# Patient Record
Sex: FEMALE | Race: ASIAN | NOT HISPANIC OR LATINO
[De-identification: names, ages, dates, MRNs, and addresses within clinical notes are randomized per-mention and may not be internally consistent; named-entity substitution may affect disease eponyms.]

---

## 2017-09-05 ENCOUNTER — APPOINTMENT (OUTPATIENT)
Dept: OBGYN | Facility: CLINIC | Age: 22
End: 2017-09-05

## 2017-09-05 PROBLEM — Z00.00 ENCOUNTER FOR PREVENTIVE HEALTH EXAMINATION: Status: ACTIVE | Noted: 2017-09-05

## 2018-08-25 ENCOUNTER — EMERGENCY (EMERGENCY)
Facility: HOSPITAL | Age: 23
LOS: 1 days | Discharge: HOME | End: 2018-08-25
Attending: EMERGENCY MEDICINE | Admitting: EMERGENCY MEDICINE
Payer: MEDICAID

## 2018-08-25 VITALS
DIASTOLIC BLOOD PRESSURE: 68 MMHG | TEMPERATURE: 98 F | SYSTOLIC BLOOD PRESSURE: 105 MMHG | RESPIRATION RATE: 16 BRPM | OXYGEN SATURATION: 100 % | HEART RATE: 88 BPM

## 2018-08-25 VITALS
SYSTOLIC BLOOD PRESSURE: 113 MMHG | HEART RATE: 96 BPM | DIASTOLIC BLOOD PRESSURE: 78 MMHG | TEMPERATURE: 99 F | RESPIRATION RATE: 18 BRPM | OXYGEN SATURATION: 100 %

## 2018-08-25 LAB
ALBUMIN SERPL ELPH-MCNC: 4.3 G/DL — SIGNIFICANT CHANGE UP (ref 3.3–5)
ALP SERPL-CCNC: 60 U/L — SIGNIFICANT CHANGE UP (ref 40–120)
ALT FLD-CCNC: 19 U/L — SIGNIFICANT CHANGE UP (ref 4–33)
APPEARANCE UR: CLEAR — SIGNIFICANT CHANGE UP
AST SERPL-CCNC: 23 U/L — SIGNIFICANT CHANGE UP (ref 4–32)
BASOPHILS # BLD AUTO: 0.03 K/UL — SIGNIFICANT CHANGE UP (ref 0–0.2)
BASOPHILS NFR BLD AUTO: 0.2 % — SIGNIFICANT CHANGE UP (ref 0–2)
BILIRUB SERPL-MCNC: 0.5 MG/DL — SIGNIFICANT CHANGE UP (ref 0.2–1.2)
BILIRUB UR-MCNC: NEGATIVE — SIGNIFICANT CHANGE UP
BLOOD UR QL VISUAL: NEGATIVE — SIGNIFICANT CHANGE UP
BUN SERPL-MCNC: 6 MG/DL — LOW (ref 7–23)
CALCIUM SERPL-MCNC: 9.3 MG/DL — SIGNIFICANT CHANGE UP (ref 8.4–10.5)
CHLORIDE SERPL-SCNC: 102 MMOL/L — SIGNIFICANT CHANGE UP (ref 98–107)
CO2 SERPL-SCNC: 22 MMOL/L — SIGNIFICANT CHANGE UP (ref 22–31)
COLOR SPEC: SIGNIFICANT CHANGE UP
CREAT SERPL-MCNC: 0.49 MG/DL — LOW (ref 0.5–1.3)
EOSINOPHIL # BLD AUTO: 0.01 K/UL — SIGNIFICANT CHANGE UP (ref 0–0.5)
EOSINOPHIL NFR BLD AUTO: 0.1 % — SIGNIFICANT CHANGE UP (ref 0–6)
GLUCOSE SERPL-MCNC: 82 MG/DL — SIGNIFICANT CHANGE UP (ref 70–99)
GLUCOSE UR-MCNC: NEGATIVE — SIGNIFICANT CHANGE UP
HCT VFR BLD CALC: 35 % — SIGNIFICANT CHANGE UP (ref 34.5–45)
HGB BLD-MCNC: 11.8 G/DL — SIGNIFICANT CHANGE UP (ref 11.5–15.5)
IMM GRANULOCYTES # BLD AUTO: 0.07 # — SIGNIFICANT CHANGE UP
IMM GRANULOCYTES NFR BLD AUTO: 0.5 % — SIGNIFICANT CHANGE UP (ref 0–1.5)
KETONES UR-MCNC: SIGNIFICANT CHANGE UP
LEUKOCYTE ESTERASE UR-ACNC: NEGATIVE — SIGNIFICANT CHANGE UP
LYMPHOCYTES # BLD AUTO: 1.15 K/UL — SIGNIFICANT CHANGE UP (ref 1–3.3)
LYMPHOCYTES # BLD AUTO: 7.8 % — LOW (ref 13–44)
MCHC RBC-ENTMCNC: 31.9 PG — SIGNIFICANT CHANGE UP (ref 27–34)
MCHC RBC-ENTMCNC: 33.7 % — SIGNIFICANT CHANGE UP (ref 32–36)
MCV RBC AUTO: 94.6 FL — SIGNIFICANT CHANGE UP (ref 80–100)
MONOCYTES # BLD AUTO: 0.56 K/UL — SIGNIFICANT CHANGE UP (ref 0–0.9)
MONOCYTES NFR BLD AUTO: 3.8 % — SIGNIFICANT CHANGE UP (ref 2–14)
NEUTROPHILS # BLD AUTO: 12.83 K/UL — HIGH (ref 1.8–7.4)
NEUTROPHILS NFR BLD AUTO: 87.6 % — HIGH (ref 43–77)
NITRITE UR-MCNC: NEGATIVE — SIGNIFICANT CHANGE UP
NRBC # FLD: 0 — SIGNIFICANT CHANGE UP
PH UR: 8 — SIGNIFICANT CHANGE UP (ref 5–8)
PLATELET # BLD AUTO: 254 K/UL — SIGNIFICANT CHANGE UP (ref 150–400)
PMV BLD: 10.2 FL — SIGNIFICANT CHANGE UP (ref 7–13)
POTASSIUM SERPL-MCNC: 4.4 MMOL/L — SIGNIFICANT CHANGE UP (ref 3.5–5.3)
POTASSIUM SERPL-SCNC: 4.4 MMOL/L — SIGNIFICANT CHANGE UP (ref 3.5–5.3)
PROT SERPL-MCNC: 7 G/DL — SIGNIFICANT CHANGE UP (ref 6–8.3)
PROT UR-MCNC: NEGATIVE — SIGNIFICANT CHANGE UP
RBC # BLD: 3.7 M/UL — LOW (ref 3.8–5.2)
RBC # FLD: 12.1 % — SIGNIFICANT CHANGE UP (ref 10.3–14.5)
SODIUM SERPL-SCNC: 139 MMOL/L — SIGNIFICANT CHANGE UP (ref 135–145)
SP GR SPEC: 1.01 — SIGNIFICANT CHANGE UP (ref 1–1.04)
UROBILINOGEN FLD QL: NORMAL — SIGNIFICANT CHANGE UP
WBC # BLD: 14.65 K/UL — HIGH (ref 3.8–10.5)
WBC # FLD AUTO: 14.65 K/UL — HIGH (ref 3.8–10.5)

## 2018-08-25 PROCEDURE — 76830 TRANSVAGINAL US NON-OB: CPT | Mod: 26

## 2018-08-25 PROCEDURE — 99284 EMERGENCY DEPT VISIT MOD MDM: CPT | Mod: 25

## 2018-08-25 RX ORDER — ONDANSETRON 8 MG/1
4 TABLET, FILM COATED ORAL ONCE
Qty: 0 | Refills: 0 | Status: COMPLETED | OUTPATIENT
Start: 2018-08-25 | End: 2018-08-25

## 2018-08-25 RX ORDER — LIDOCAINE 4 G/100G
10 CREAM TOPICAL ONCE
Qty: 0 | Refills: 0 | Status: COMPLETED | OUTPATIENT
Start: 2018-08-25 | End: 2018-08-25

## 2018-08-25 RX ORDER — SODIUM CHLORIDE 9 MG/ML
1000 INJECTION INTRAMUSCULAR; INTRAVENOUS; SUBCUTANEOUS ONCE
Qty: 0 | Refills: 0 | Status: COMPLETED | OUTPATIENT
Start: 2018-08-25 | End: 2018-08-25

## 2018-08-25 RX ADMIN — SODIUM CHLORIDE 1000 MILLILITER(S): 9 INJECTION INTRAMUSCULAR; INTRAVENOUS; SUBCUTANEOUS at 06:10

## 2018-08-25 RX ADMIN — Medication 30 MILLILITER(S): at 06:29

## 2018-08-25 RX ADMIN — LIDOCAINE 10 MILLILITER(S): 4 CREAM TOPICAL at 06:29

## 2018-08-25 NOTE — ED ADULT NURSE NOTE - NSIMPLEMENTINTERV_GEN_ALL_ED
Implemented All Universal Safety Interventions:  [place] to call system. Call bell, personal items and telephone within reach. Instruct patient to call for assistance. Room bathroom lighting operational. Non-slip footwear when patient is off stretcher. Physically safe environment: no spills, clutter or unnecessary equipment. Stretcher in lowest position, wheels locked, appropriate side rails in place.

## 2018-08-25 NOTE — ED PROVIDER NOTE - PHYSICAL EXAMINATION
Gen: No acute distress, alert, cooperative  Head: Normocephalic, Atraumatic  HEENT: PERRL, oral mucosa moist, no swelling or erythema intraorally, normal conjunctiva  Lung: CTAB, no respiratory distress, no crackles or wheezes  CV: rrr, no murmur  Abd: soft, NTND, no rebound or guarding  MSK: No LE edema  Neuro: No focal neurologic deficits  Skin: Warm and dry, no evidence of rash   Psych: normal affect, follows commands

## 2018-08-25 NOTE — ED PROVIDER NOTE - OBJECTIVE STATEMENT
23yo  LMP 18 presenting with nausea/vomiting, sore throat since 7pm. Last ate at 2pm. Currently feels like she cant swallow anything due to nausea and throat pain. Even throwing up water at this point. Hasn't taken any meds for pain or nausea yet. When she first vomited noticed streaks of red, now vomit is brownish colored.

## 2018-08-25 NOTE — ED ADULT NURSE NOTE - CHIEF COMPLAINT QUOTE
Pt arrives by [facility] for nausea/vomiting causing sore throat and feeling difficulty to breathe.  States vomiting began ~7pm last night.  , 12-13wks pregnant, LMP 6/2.  Reports having lower pelvic cramps, no vag bleeding or discharge.  Denies having complications in prior pregnancy, no significant PMHx or abdominal surgeries.  Pt vitally stable, resps even/unlabored on RA, o2sat 100% on RA.

## 2018-08-25 NOTE — ED PROVIDER NOTE - MEDICAL DECISION MAKING DETAILS
23yo  LMP 18 presenting with nausea/vomiting, sore throat since 7pm. Vomiting dark brown vomit, prior had streaks of blood. Labs, anti-nausea, reassess. 21yo  LMP 18 presenting with nausea/vomiting, sore throat since 7pm. Vomiting dark brown vomit, prior had streaks of blood. Appears well, vitals stable. Labs, anti-nausea, reassess. Patient declines cxr at this time.

## 2018-08-25 NOTE — ED PROVIDER NOTE - PROGRESS NOTE DETAILS
Berna Bello M.D: pt signed out to me pending labs and US results. labs demonstrate mild elevation in WBC which is nonspecific, and US demonstrates single live IUP without complications. no bacteria seen in urine. results discussed with pt who is understanding and feels improved with medications. mic brown.

## 2018-08-25 NOTE — ED ADULT NURSE NOTE - OBJECTIVE STATEMENT
Received pt in room 17, ambulatory, pt A&Ox4, respirations even and unlabored b/l. Abdomen soft, nondistended, nontender. Pt states "I have a flight at 7am, I just want to see the doctor." Awaiting MD garza. Will continue to monitor. Received pt in room 17, ambulatory, pt A&Ox4, respirations even and unlabored b/l. Abdomen soft, nondistended, nontender. Pt states "I have a flight at 7am, I just want to see the doctor." IVL 20g Angiocath placed on left AC. Labs sent. Awaiting MD garza. Will continue to monitor.

## 2018-08-25 NOTE — ED PROVIDER NOTE - ATTENDING CONTRIBUTION TO CARE
21 y/o F  at approx 12 weeks gestation (no previously confirmed IUP) here with nausea and vomiting.  Pt reports she had an episode of vomiting tonight.  Afterwards pt developed an uncomfortable sensation in her throat.  Pt reports retching and additional episodes of vomiting; last emesis noted blood streaks.  Pt currently c/o sore throat/uncomfortable sensation in throat.  Only with abd pain with vomiting, none otherwise.  NO vaginal bleeding, dysuria, fever, back pain.  Well appearing, sitting comfortably in stretcher, awake and alert, nontoxic.  AF/VSS.  Mucosa is moist, post oropharynx is clear, uvula midline, no stridor/drooling/voice changes.  Neck is supple.  Lungs cta bl.  Cards nl S1/S2, RRR, no MRG.  Abd soft ntnd.  No pedal edema or calf tenderness.  Plan for labs tvus urine antiemetics/maalox/lido for symptomatic relief.  As pt does not yet have confirmed IUP, will need to obtain US here.

## 2018-08-25 NOTE — ED ADULT TRIAGE NOTE - CHIEF COMPLAINT QUOTE
Pt arrives by [facility] for nausea/vomiting causing sore throat and feeling difficulty to breathe.  States vomiting began ~7pm last night.  , 12-13wks pregnant, LMP 6/2.  Reports having lower pelvic cramps, no vag bleeding or discharge.  Denies having complications in prior pregnancy, no significant PMHx or abdominal surgeries.  Pt vitally stable, resps even/unlabored on RA, o2sat 100% on RA.

## 2018-08-26 LAB
BACTERIA UR CULT: SIGNIFICANT CHANGE UP
SPECIMEN SOURCE: SIGNIFICANT CHANGE UP

## 2018-11-12 ENCOUNTER — APPOINTMENT (OUTPATIENT)
Dept: ANTEPARTUM | Facility: CLINIC | Age: 23
End: 2018-11-12
Payer: MEDICAID

## 2018-11-12 ENCOUNTER — ASOB RESULT (OUTPATIENT)
Age: 23
End: 2018-11-12

## 2018-11-12 PROCEDURE — 76811 OB US DETAILED SNGL FETUS: CPT

## 2018-12-03 ENCOUNTER — ASOB RESULT (OUTPATIENT)
Age: 23
End: 2018-12-03

## 2018-12-03 ENCOUNTER — APPOINTMENT (OUTPATIENT)
Dept: ANTEPARTUM | Facility: CLINIC | Age: 23
End: 2018-12-03
Payer: MEDICAID

## 2018-12-03 PROCEDURE — 76805 OB US >/= 14 WKS SNGL FETUS: CPT

## 2018-12-03 PROCEDURE — 76820 UMBILICAL ARTERY ECHO: CPT

## 2018-12-17 ENCOUNTER — ASOB RESULT (OUTPATIENT)
Age: 23
End: 2018-12-17

## 2018-12-17 ENCOUNTER — APPOINTMENT (OUTPATIENT)
Dept: ANTEPARTUM | Facility: CLINIC | Age: 23
End: 2018-12-17
Payer: MEDICAID

## 2018-12-17 ENCOUNTER — APPOINTMENT (OUTPATIENT)
Dept: ANTEPARTUM | Facility: CLINIC | Age: 23
End: 2018-12-17

## 2018-12-17 PROCEDURE — 76816 OB US FOLLOW-UP PER FETUS: CPT

## 2018-12-17 PROCEDURE — 76819 FETAL BIOPHYS PROFIL W/O NST: CPT

## 2019-01-14 ENCOUNTER — ASOB RESULT (OUTPATIENT)
Age: 24
End: 2019-01-14

## 2019-01-14 ENCOUNTER — APPOINTMENT (OUTPATIENT)
Dept: ANTEPARTUM | Facility: CLINIC | Age: 24
End: 2019-01-14
Payer: MEDICAID

## 2019-01-14 PROCEDURE — 76819 FETAL BIOPHYS PROFIL W/O NST: CPT

## 2019-01-14 PROCEDURE — 76816 OB US FOLLOW-UP PER FETUS: CPT

## 2019-02-26 ENCOUNTER — OUTPATIENT (OUTPATIENT)
Dept: OUTPATIENT SERVICES | Facility: HOSPITAL | Age: 24
LOS: 1 days | End: 2019-02-26

## 2019-02-26 ENCOUNTER — EMERGENCY (EMERGENCY)
Facility: HOSPITAL | Age: 24
LOS: 1 days | Discharge: STILL ADMITTED OR ADMINISTRATIVE | End: 2019-02-26
Admitting: EMERGENCY MEDICINE

## 2019-02-26 VITALS
TEMPERATURE: 98 F | WEIGHT: 160.06 LBS | HEART RATE: 125 BPM | SYSTOLIC BLOOD PRESSURE: 108 MMHG | RESPIRATION RATE: 20 BRPM | HEIGHT: 60 IN | DIASTOLIC BLOOD PRESSURE: 68 MMHG

## 2019-02-26 VITALS
TEMPERATURE: 98 F | HEART RATE: 110 BPM | OXYGEN SATURATION: 100 % | DIASTOLIC BLOOD PRESSURE: 47 MMHG | SYSTOLIC BLOOD PRESSURE: 117 MMHG | RESPIRATION RATE: 16 BRPM

## 2019-02-26 DIAGNOSIS — Z98.891 HISTORY OF UTERINE SCAR FROM PREVIOUS SURGERY: Chronic | ICD-10-CM

## 2019-02-26 DIAGNOSIS — Z34.90 ENCOUNTER FOR SUPERVISION OF NORMAL PREGNANCY, UNSPECIFIED, UNSPECIFIED TRIMESTER: ICD-10-CM

## 2019-02-26 DIAGNOSIS — O34.219 MATERNAL CARE FOR UNSPECIFIED TYPE SCAR FROM PREVIOUS CESAREAN DELIVERY: ICD-10-CM

## 2019-02-26 DIAGNOSIS — Z3A.00 WEEKS OF GESTATION OF PREGNANCY NOT SPECIFIED: ICD-10-CM

## 2019-02-26 DIAGNOSIS — O26.899 OTHER SPECIFIED PREGNANCY RELATED CONDITIONS, UNSPECIFIED TRIMESTER: ICD-10-CM

## 2019-02-26 LAB
APPEARANCE UR: SIGNIFICANT CHANGE UP
APPEARANCE UR: SIGNIFICANT CHANGE UP
BACTERIA # UR AUTO: NEGATIVE — SIGNIFICANT CHANGE UP
BACTERIA # UR AUTO: SIGNIFICANT CHANGE UP
BILIRUB UR-MCNC: NEGATIVE — SIGNIFICANT CHANGE UP
BILIRUB UR-MCNC: NEGATIVE — SIGNIFICANT CHANGE UP
BLD GP AB SCN SERPL QL: NEGATIVE — SIGNIFICANT CHANGE UP
BLOOD UR QL VISUAL: NEGATIVE — SIGNIFICANT CHANGE UP
BLOOD UR QL VISUAL: NEGATIVE — SIGNIFICANT CHANGE UP
COLOR SPEC: SIGNIFICANT CHANGE UP
COLOR SPEC: YELLOW — SIGNIFICANT CHANGE UP
FIBRINOGEN PPP-MCNC: 684 MG/DL — HIGH (ref 350–510)
GLUCOSE UR-MCNC: NEGATIVE — SIGNIFICANT CHANGE UP
GLUCOSE UR-MCNC: NEGATIVE — SIGNIFICANT CHANGE UP
HCT VFR BLD CALC: 36.4 % — SIGNIFICANT CHANGE UP (ref 34.5–45)
HGB BLD-MCNC: 11.8 G/DL — SIGNIFICANT CHANGE UP (ref 11.5–15.5)
HYALINE CASTS # UR AUTO: HIGH
HYALINE CASTS # UR AUTO: SIGNIFICANT CHANGE UP
KETONES UR-MCNC: NEGATIVE — SIGNIFICANT CHANGE UP
KETONES UR-MCNC: NEGATIVE — SIGNIFICANT CHANGE UP
LEUKOCYTE ESTERASE UR-ACNC: SIGNIFICANT CHANGE UP
LEUKOCYTE ESTERASE UR-ACNC: SIGNIFICANT CHANGE UP
MCHC RBC-ENTMCNC: 31 PG — SIGNIFICANT CHANGE UP (ref 27–34)
MCHC RBC-ENTMCNC: 32.4 % — SIGNIFICANT CHANGE UP (ref 32–36)
MCV RBC AUTO: 95.5 FL — SIGNIFICANT CHANGE UP (ref 80–100)
NITRITE UR-MCNC: NEGATIVE — SIGNIFICANT CHANGE UP
NITRITE UR-MCNC: NEGATIVE — SIGNIFICANT CHANGE UP
NRBC # FLD: 0 K/UL — LOW (ref 25–125)
PH UR: 7 — SIGNIFICANT CHANGE UP (ref 5–8)
PH UR: 7.5 — SIGNIFICANT CHANGE UP (ref 5–8)
PLATELET # BLD AUTO: 253 K/UL — SIGNIFICANT CHANGE UP (ref 150–400)
PMV BLD: 10.2 FL — SIGNIFICANT CHANGE UP (ref 7–13)
PROT UR-MCNC: 10 — SIGNIFICANT CHANGE UP
PROT UR-MCNC: 20 — SIGNIFICANT CHANGE UP
RBC # BLD: 3.81 M/UL — SIGNIFICANT CHANGE UP (ref 3.8–5.2)
RBC # FLD: 13.1 % — SIGNIFICANT CHANGE UP (ref 10.3–14.5)
RBC CASTS # UR COMP ASSIST: SIGNIFICANT CHANGE UP (ref 0–?)
RBC CASTS # UR COMP ASSIST: SIGNIFICANT CHANGE UP (ref 0–?)
RH IG SCN BLD-IMP: POSITIVE — SIGNIFICANT CHANGE UP
RH IG SCN BLD-IMP: POSITIVE — SIGNIFICANT CHANGE UP
SP GR SPEC: 1.01 — SIGNIFICANT CHANGE UP (ref 1–1.04)
SP GR SPEC: 1.01 — SIGNIFICANT CHANGE UP (ref 1–1.04)
SQUAMOUS # UR AUTO: SIGNIFICANT CHANGE UP
SQUAMOUS # UR AUTO: SIGNIFICANT CHANGE UP
UROBILINOGEN FLD QL: NORMAL — SIGNIFICANT CHANGE UP
UROBILINOGEN FLD QL: NORMAL — SIGNIFICANT CHANGE UP
WBC # BLD: 9.14 K/UL — SIGNIFICANT CHANGE UP (ref 3.8–10.5)
WBC # FLD AUTO: 9.14 K/UL — SIGNIFICANT CHANGE UP (ref 3.8–10.5)
WBC UR QL: >50 — HIGH (ref 0–?)
WBC UR QL: SIGNIFICANT CHANGE UP (ref 0–?)

## 2019-02-26 RX ORDER — SODIUM CHLORIDE 9 MG/ML
1000 INJECTION, SOLUTION INTRAVENOUS
Qty: 0 | Refills: 0 | Status: DISCONTINUED | OUTPATIENT
Start: 2019-03-12 | End: 2019-03-12

## 2019-02-26 NOTE — ED ADULT NURSE NOTE - CHIEF COMPLAINT QUOTE
Pt brought in by EMS 38 weeks pregnant complaining of pelvic and back pain. Pt to be seen in L&D. Pt noted to have 15G R AC .

## 2019-02-26 NOTE — H&P PST ADULT - ASSESSMENT
Pt. is a 24 yo pregnant female that had a prior .  Pt. accidentally banged her stomach into the container top while reaching for something last night.  Pt. did not notify her OB.  Colleague called OB's office and spoke to Danay CAI informing her of injury to her stomach as well as tachycardia while at PST.  EMS called and pt. sent to L&D. Pt. is a 24 yo pregnant female that had a prior .  Pt. accidentally banged her stomach into the container top while reaching for something last night.  Pt. did not notify her OB.  Colleague called OB's office and spoke to Danay CAI informing her of injury to her stomach as well as tachycardia while at PST.  EMS called and arrived prior to EKG.  EKG done on paramedics ZOLL machine.  EKG in the chart.  Pt. sent to L&D.

## 2019-02-26 NOTE — H&P PST ADULT - ANESTHESIA, PREVIOUS REACTION, PROFILE
"they did my  because I had a fever and my son had a fever and they said it's not good for the baby so they took him out", denies family hx

## 2019-02-26 NOTE — H&P PST ADULT - NSANTHOSAYNRD_GEN_A_CORE
No. ESTELA screening performed.  STOP BANG Legend: 0-2 = LOW Risk; 3-4 = INTERMEDIATE Risk; 5-8 = HIGH Risk

## 2019-02-27 LAB — T PALLIDUM AB TITR SER: NEGATIVE — SIGNIFICANT CHANGE UP

## 2019-03-11 ENCOUNTER — TRANSCRIPTION ENCOUNTER (OUTPATIENT)
Age: 24
End: 2019-03-11

## 2019-03-12 ENCOUNTER — INPATIENT (INPATIENT)
Facility: HOSPITAL | Age: 24
LOS: 2 days | Discharge: HOME | End: 2019-03-15
Attending: OBSTETRICS & GYNECOLOGY | Admitting: OBSTETRICS & GYNECOLOGY
Payer: MEDICAID

## 2019-03-12 VITALS — HEIGHT: 60 IN | WEIGHT: 158.73 LBS

## 2019-03-12 DIAGNOSIS — O34.219 MATERNAL CARE FOR UNSPECIFIED TYPE SCAR FROM PREVIOUS CESAREAN DELIVERY: ICD-10-CM

## 2019-03-12 DIAGNOSIS — Z98.891 HISTORY OF UTERINE SCAR FROM PREVIOUS SURGERY: Chronic | ICD-10-CM

## 2019-03-12 LAB
BASOPHILS # BLD AUTO: 0.02 K/UL — SIGNIFICANT CHANGE UP (ref 0–0.2)
BASOPHILS # BLD AUTO: 0.02 K/UL — SIGNIFICANT CHANGE UP (ref 0–0.2)
BASOPHILS NFR BLD AUTO: 0.2 % — SIGNIFICANT CHANGE UP (ref 0–2)
BASOPHILS NFR BLD AUTO: 0.3 % — SIGNIFICANT CHANGE UP (ref 0–2)
BLD GP AB SCN SERPL QL: NEGATIVE — SIGNIFICANT CHANGE UP
EOSINOPHIL # BLD AUTO: 0 K/UL — SIGNIFICANT CHANGE UP (ref 0–0.5)
EOSINOPHIL # BLD AUTO: 0.06 K/UL — SIGNIFICANT CHANGE UP (ref 0–0.5)
EOSINOPHIL NFR BLD AUTO: 0 % — SIGNIFICANT CHANGE UP (ref 0–6)
EOSINOPHIL NFR BLD AUTO: 0.8 % — SIGNIFICANT CHANGE UP (ref 0–6)
HCT VFR BLD CALC: 33.5 % — LOW (ref 34.5–45)
HCT VFR BLD CALC: 38.7 % — SIGNIFICANT CHANGE UP (ref 34.5–45)
HGB BLD-MCNC: 10.9 G/DL — LOW (ref 11.5–15.5)
HGB BLD-MCNC: 12.7 G/DL — SIGNIFICANT CHANGE UP (ref 11.5–15.5)
IMM GRANULOCYTES NFR BLD AUTO: 0.4 % — SIGNIFICANT CHANGE UP (ref 0–1.5)
IMM GRANULOCYTES NFR BLD AUTO: 0.5 % — SIGNIFICANT CHANGE UP (ref 0–1.5)
LYMPHOCYTES # BLD AUTO: 1.49 K/UL — SIGNIFICANT CHANGE UP (ref 1–3.3)
LYMPHOCYTES # BLD AUTO: 1.94 K/UL — SIGNIFICANT CHANGE UP (ref 1–3.3)
LYMPHOCYTES # BLD AUTO: 11.7 % — LOW (ref 13–44)
LYMPHOCYTES # BLD AUTO: 24.3 % — SIGNIFICANT CHANGE UP (ref 13–44)
MCHC RBC-ENTMCNC: 31 PG — SIGNIFICANT CHANGE UP (ref 27–34)
MCHC RBC-ENTMCNC: 31.6 PG — SIGNIFICANT CHANGE UP (ref 27–34)
MCHC RBC-ENTMCNC: 32.5 % — SIGNIFICANT CHANGE UP (ref 32–36)
MCHC RBC-ENTMCNC: 32.8 % — SIGNIFICANT CHANGE UP (ref 32–36)
MCV RBC AUTO: 94.4 FL — SIGNIFICANT CHANGE UP (ref 80–100)
MCV RBC AUTO: 97.1 FL — SIGNIFICANT CHANGE UP (ref 80–100)
MONOCYTES # BLD AUTO: 0.55 K/UL — SIGNIFICANT CHANGE UP (ref 0–0.9)
MONOCYTES # BLD AUTO: 0.71 K/UL — SIGNIFICANT CHANGE UP (ref 0–0.9)
MONOCYTES NFR BLD AUTO: 5.6 % — SIGNIFICANT CHANGE UP (ref 2–14)
MONOCYTES NFR BLD AUTO: 6.9 % — SIGNIFICANT CHANGE UP (ref 2–14)
NEUTROPHILS # BLD AUTO: 10.47 K/UL — HIGH (ref 1.8–7.4)
NEUTROPHILS # BLD AUTO: 5.37 K/UL — SIGNIFICANT CHANGE UP (ref 1.8–7.4)
NEUTROPHILS NFR BLD AUTO: 67.3 % — SIGNIFICANT CHANGE UP (ref 43–77)
NEUTROPHILS NFR BLD AUTO: 82 % — HIGH (ref 43–77)
NRBC # FLD: 0 K/UL — LOW (ref 25–125)
NRBC # FLD: 0 K/UL — LOW (ref 25–125)
PLATELET # BLD AUTO: 219 K/UL — SIGNIFICANT CHANGE UP (ref 150–400)
PLATELET # BLD AUTO: 242 K/UL — SIGNIFICANT CHANGE UP (ref 150–400)
PMV BLD: 10 FL — SIGNIFICANT CHANGE UP (ref 7–13)
PMV BLD: 9.6 FL — SIGNIFICANT CHANGE UP (ref 7–13)
RBC # BLD: 3.45 M/UL — LOW (ref 3.8–5.2)
RBC # BLD: 4.1 M/UL — SIGNIFICANT CHANGE UP (ref 3.8–5.2)
RBC # FLD: 12.8 % — SIGNIFICANT CHANGE UP (ref 10.3–14.5)
RBC # FLD: 13 % — SIGNIFICANT CHANGE UP (ref 10.3–14.5)
RH IG SCN BLD-IMP: POSITIVE — SIGNIFICANT CHANGE UP
WBC # BLD: 12.75 K/UL — HIGH (ref 3.8–10.5)
WBC # BLD: 7.97 K/UL — SIGNIFICANT CHANGE UP (ref 3.8–10.5)
WBC # FLD AUTO: 12.75 K/UL — HIGH (ref 3.8–10.5)
WBC # FLD AUTO: 7.97 K/UL — SIGNIFICANT CHANGE UP (ref 3.8–10.5)

## 2019-03-12 PROCEDURE — 71045 X-RAY EXAM CHEST 1 VIEW: CPT | Mod: 26

## 2019-03-12 RX ORDER — GLYCERIN ADULT
1 SUPPOSITORY, RECTAL RECTAL AT BEDTIME
Qty: 0 | Refills: 0 | Status: DISCONTINUED | OUTPATIENT
Start: 2019-03-12 | End: 2019-03-15

## 2019-03-12 RX ORDER — SIMETHICONE 80 MG/1
80 TABLET, CHEWABLE ORAL EVERY 4 HOURS
Qty: 0 | Refills: 0 | Status: DISCONTINUED | OUTPATIENT
Start: 2019-03-12 | End: 2019-03-15

## 2019-03-12 RX ORDER — TETANUS TOXOID, REDUCED DIPHTHERIA TOXOID AND ACELLULAR PERTUSSIS VACCINE, ADSORBED 5; 2.5; 8; 8; 2.5 [IU]/.5ML; [IU]/.5ML; UG/.5ML; UG/.5ML; UG/.5ML
0.5 SUSPENSION INTRAMUSCULAR ONCE
Qty: 0 | Refills: 0 | Status: DISCONTINUED | OUTPATIENT
Start: 2019-03-12 | End: 2019-03-15

## 2019-03-12 RX ORDER — OXYTOCIN 10 UNIT/ML
41.67 VIAL (ML) INJECTION
Qty: 20 | Refills: 0 | Status: DISCONTINUED | OUTPATIENT
Start: 2019-03-12 | End: 2019-03-12

## 2019-03-12 RX ORDER — HEPARIN SODIUM 5000 [USP'U]/ML
5000 INJECTION INTRAVENOUS; SUBCUTANEOUS EVERY 12 HOURS
Qty: 0 | Refills: 0 | Status: DISCONTINUED | OUTPATIENT
Start: 2019-03-12 | End: 2019-03-15

## 2019-03-12 RX ORDER — DOCUSATE SODIUM 100 MG
100 CAPSULE ORAL
Qty: 0 | Refills: 0 | Status: DISCONTINUED | OUTPATIENT
Start: 2019-03-12 | End: 2019-03-15

## 2019-03-12 RX ORDER — OXYCODONE HYDROCHLORIDE 5 MG/1
5 TABLET ORAL EVERY 4 HOURS
Qty: 0 | Refills: 0 | Status: COMPLETED | OUTPATIENT
Start: 2019-03-12 | End: 2019-03-19

## 2019-03-12 RX ORDER — LANOLIN
1 OINTMENT (GRAM) TOPICAL
Qty: 0 | Refills: 0 | Status: DISCONTINUED | OUTPATIENT
Start: 2019-03-12 | End: 2019-03-15

## 2019-03-12 RX ORDER — KETOROLAC TROMETHAMINE 30 MG/ML
30 SYRINGE (ML) INJECTION EVERY 6 HOURS
Qty: 0 | Refills: 0 | Status: DISCONTINUED | OUTPATIENT
Start: 2019-03-12 | End: 2019-03-13

## 2019-03-12 RX ORDER — SODIUM CHLORIDE 9 MG/ML
1000 INJECTION, SOLUTION INTRAVENOUS
Qty: 0 | Refills: 0 | Status: DISCONTINUED | OUTPATIENT
Start: 2019-03-12 | End: 2019-03-12

## 2019-03-12 RX ORDER — OXYCODONE HYDROCHLORIDE 5 MG/1
5 TABLET ORAL
Qty: 0 | Refills: 0 | Status: COMPLETED | OUTPATIENT
Start: 2019-03-12 | End: 2019-03-19

## 2019-03-12 RX ORDER — OXYCODONE HYDROCHLORIDE 5 MG/1
10 TABLET ORAL
Qty: 0 | Refills: 0 | Status: DISCONTINUED | OUTPATIENT
Start: 2019-03-12 | End: 2019-03-13

## 2019-03-12 RX ORDER — HYDROMORPHONE HYDROCHLORIDE 2 MG/ML
1 INJECTION INTRAMUSCULAR; INTRAVENOUS; SUBCUTANEOUS
Qty: 0 | Refills: 0 | Status: DISCONTINUED | OUTPATIENT
Start: 2019-03-12 | End: 2019-03-12

## 2019-03-12 RX ORDER — DIPHENHYDRAMINE HCL 50 MG
25 CAPSULE ORAL EVERY 6 HOURS
Qty: 0 | Refills: 0 | Status: DISCONTINUED | OUTPATIENT
Start: 2019-03-12 | End: 2019-03-15

## 2019-03-12 RX ORDER — NALOXONE HYDROCHLORIDE 4 MG/.1ML
0.1 SPRAY NASAL
Qty: 0 | Refills: 0 | Status: DISCONTINUED | OUTPATIENT
Start: 2019-03-12 | End: 2019-03-13

## 2019-03-12 RX ORDER — METOCLOPRAMIDE HCL 10 MG
10 TABLET ORAL ONCE
Qty: 0 | Refills: 0 | Status: COMPLETED | OUTPATIENT
Start: 2019-03-12 | End: 2019-03-12

## 2019-03-12 RX ORDER — ONDANSETRON 8 MG/1
4 TABLET, FILM COATED ORAL EVERY 6 HOURS
Qty: 0 | Refills: 0 | Status: DISCONTINUED | OUTPATIENT
Start: 2019-03-12 | End: 2019-03-13

## 2019-03-12 RX ORDER — CITRIC ACID/SODIUM CITRATE 300-500 MG
30 SOLUTION, ORAL ORAL ONCE
Qty: 0 | Refills: 0 | Status: COMPLETED | OUTPATIENT
Start: 2019-03-12 | End: 2019-03-12

## 2019-03-12 RX ORDER — IBUPROFEN 200 MG
600 TABLET ORAL EVERY 6 HOURS
Qty: 0 | Refills: 0 | Status: COMPLETED | OUTPATIENT
Start: 2019-03-12 | End: 2020-02-08

## 2019-03-12 RX ORDER — OXYCODONE HYDROCHLORIDE 5 MG/1
5 TABLET ORAL
Qty: 0 | Refills: 0 | Status: DISCONTINUED | OUTPATIENT
Start: 2019-03-12 | End: 2019-03-13

## 2019-03-12 RX ORDER — FAMOTIDINE 10 MG/ML
20 INJECTION INTRAVENOUS ONCE
Qty: 0 | Refills: 0 | Status: COMPLETED | OUTPATIENT
Start: 2019-03-12 | End: 2019-03-12

## 2019-03-12 RX ORDER — BUTORPHANOL TARTRATE 2 MG/ML
0.25 INJECTION, SOLUTION INTRAMUSCULAR; INTRAVENOUS EVERY 6 HOURS
Qty: 0 | Refills: 0 | Status: DISCONTINUED | OUTPATIENT
Start: 2019-03-12 | End: 2019-03-13

## 2019-03-12 RX ORDER — SODIUM CHLORIDE 9 MG/ML
1000 INJECTION, SOLUTION INTRAVENOUS ONCE
Qty: 0 | Refills: 0 | Status: COMPLETED | OUTPATIENT
Start: 2019-03-12 | End: 2019-03-12

## 2019-03-12 RX ORDER — FERROUS SULFATE 325(65) MG
325 TABLET ORAL DAILY
Qty: 0 | Refills: 0 | Status: DISCONTINUED | OUTPATIENT
Start: 2019-03-12 | End: 2019-03-15

## 2019-03-12 RX ORDER — ACETAMINOPHEN 500 MG
975 TABLET ORAL EVERY 6 HOURS
Qty: 0 | Refills: 0 | Status: DISCONTINUED | OUTPATIENT
Start: 2019-03-12 | End: 2019-03-15

## 2019-03-12 RX ADMIN — Medication 30 MILLILITER(S): at 06:35

## 2019-03-12 RX ADMIN — Medication 30 MILLIGRAM(S): at 15:54

## 2019-03-12 RX ADMIN — Medication 30 MILLIGRAM(S): at 23:16

## 2019-03-12 RX ADMIN — SODIUM CHLORIDE 125 MILLILITER(S): 9 INJECTION, SOLUTION INTRAVENOUS at 06:37

## 2019-03-12 RX ADMIN — FAMOTIDINE 20 MILLIGRAM(S): 10 INJECTION INTRAVENOUS at 06:55

## 2019-03-12 RX ADMIN — SODIUM CHLORIDE 75 MILLILITER(S): 9 INJECTION, SOLUTION INTRAVENOUS at 09:32

## 2019-03-12 RX ADMIN — Medication 125 MILLIUNIT(S)/MIN: at 09:32

## 2019-03-12 RX ADMIN — Medication 30 MILLIGRAM(S): at 15:28

## 2019-03-12 RX ADMIN — HEPARIN SODIUM 5000 UNIT(S): 5000 INJECTION INTRAVENOUS; SUBCUTANEOUS at 15:34

## 2019-03-12 RX ADMIN — Medication 10 MILLIGRAM(S): at 06:35

## 2019-03-12 RX ADMIN — SODIUM CHLORIDE 2000 MILLILITER(S): 9 INJECTION, SOLUTION INTRAVENOUS at 06:35

## 2019-03-13 ENCOUNTER — TRANSCRIPTION ENCOUNTER (OUTPATIENT)
Age: 24
End: 2019-03-13

## 2019-03-13 RX ORDER — IBUPROFEN 200 MG
600 TABLET ORAL EVERY 6 HOURS
Qty: 0 | Refills: 0 | Status: DISCONTINUED | OUTPATIENT
Start: 2019-03-13 | End: 2019-03-15

## 2019-03-13 RX ADMIN — Medication 30 MILLIGRAM(S): at 05:39

## 2019-03-13 RX ADMIN — HEPARIN SODIUM 5000 UNIT(S): 5000 INJECTION INTRAVENOUS; SUBCUTANEOUS at 18:16

## 2019-03-13 RX ADMIN — Medication 30 MILLIGRAM(S): at 06:16

## 2019-03-13 RX ADMIN — Medication 100 MILLIGRAM(S): at 05:40

## 2019-03-13 RX ADMIN — SIMETHICONE 80 MILLIGRAM(S): 80 TABLET, CHEWABLE ORAL at 05:39

## 2019-03-13 RX ADMIN — Medication 975 MILLIGRAM(S): at 13:30

## 2019-03-13 RX ADMIN — Medication 975 MILLIGRAM(S): at 18:15

## 2019-03-13 RX ADMIN — Medication 975 MILLIGRAM(S): at 12:42

## 2019-03-13 RX ADMIN — Medication 30 MILLIGRAM(S): at 00:10

## 2019-03-13 RX ADMIN — Medication 1 TABLET(S): at 12:41

## 2019-03-13 RX ADMIN — Medication 975 MILLIGRAM(S): at 19:15

## 2019-03-13 RX ADMIN — HEPARIN SODIUM 5000 UNIT(S): 5000 INJECTION INTRAVENOUS; SUBCUTANEOUS at 05:39

## 2019-03-13 RX ADMIN — Medication 325 MILLIGRAM(S): at 12:42

## 2019-03-13 NOTE — PROGRESS NOTE ADULT - SUBJECTIVE AND OBJECTIVE BOX
Pain Service Follow-up  Postop Day  1    S/P  C- Section    T(C): 36.7 (03-13-19 @ 05:29), Max: 37 (03-12-19 @ 13:52)  HR: 103 (03-13-19 @ 05:29) (74 - 108)  BP: 110/70 (03-13-19 @ 05:29) (75/61 - 128/61)  RR: 17 (03-13-19 @ 05:29) (12 - 20)  SpO2: 97% (03-13-19 @ 05:29) (96% - 100%)  Wt(kg): --      THERAPY:  [X] Spinal morphine   [] Epidural morphine   [] IV PCA Hydromorphone 1 mg/ml    Sedation Score:	  [X] Alert	      [  ] Drowsy       [  ] Arousable	[  ] Asleep         [  ] Unresponsive    Side Effects:	  [X] None	      [  ] Nausea       [  ] Pruritus        [  ] Weakness   [  ] Numbness        ASSESSMENT/ PLAN   [ X ] Discontinue         [  ] Continue    [ X ] Documentation and Verification of current medications       Satisfactory Post Anesthetic Course

## 2019-03-13 NOTE — DISCHARGE NOTE OB - PATIENT PORTAL LINK FT
You can access the [facility] Patient Portal, offered by [facility], by registering with the following website: [email or web address]

## 2019-03-13 NOTE — LACTATION INITIAL EVALUATION - INTERVENTION OUTCOME
verbalizes understanding/demonstrates understanding of teaching/nbn demonstrated  deep latch and  performed  with sucking and swallowing  noted .   offered  assistance   as needed .  reviewed  breastfeeding  log  and daily  nbn  goals.

## 2019-03-13 NOTE — DISCHARGE NOTE OB - CARE PLAN
Principal Discharge DX:	 delivery delivered  Goal:	pelvic rest x 6 weeks  Assessment and plan of treatment:	pelvic rest x 6 weeks

## 2019-03-13 NOTE — DISCHARGE NOTE OB - MATERIALS PROVIDED
Discharge Medication Information for Patients and Families Pocket Guide/[place]  Immunization Record/[facility] Hearing Screen Program/Shaken Baby Prevention Handout/Breastfeeding Guide and Packet/Breastfeeding Log/Breastfeeding Mother’s Support Group Information/[facility] [place] Screening Program/Birth Certificate Instructions

## 2019-03-13 NOTE — PROGRESS NOTE ADULT - SUBJECTIVE AND OBJECTIVE BOX
OB Progress Note:  Delivery, POD#1    S: 22yo POD#1 s/p LTCS . Her pain is well controlled. She is tolerating a regular diet and passing flatus. Denies N/V. Denies CP/SOB/lightheadedness/dizziness.   She is ambulating without difficulty.   Voiding spontaneously  Denies heavy vaginal bleeding.    O:   Vital Signs Last 24 Hrs  T(C): 36.7 (13 Mar 2019 05:29), Max: 37 (12 Mar 2019 13:52)  T(F): 98.1 (13 Mar 2019 05:29), Max: 98.6 (12 Mar 2019 13:52)  HR: 103 (13 Mar 2019 05:29) (74 - 108)  BP: 110/70 (13 Mar 2019 05:29) (75/61 - 128/61)  BP(mean): 77 (12 Mar 2019 12:30) (58 - 89)  RR: 17 (13 Mar 2019 05:29) (12 - 20)  SpO2: 97% (13 Mar 2019 05:29) (96% - 100%)    Labs:  Blood type: O Positive  Rubella IgG: RPR: Negative                          10.9<L>   12.75<H> >-----------< 219    (  @ 18:43 )             33.5<L>                        12.7   7.97 >-----------< 242    (  @ 06:05 )             38.7                  PE:  General: NAD  Abdomen: Mildly distended, appropriately tender, incision c/d/i.  Extremities: No erythema, no pitting edema

## 2019-03-13 NOTE — DISCHARGE NOTE OB - CARE PROVIDER_API CALL
Rosalba Nieto (DO)  Obstetrics  Gynecology Obstetrics and Gynecology  [address] [place] [address]  Phone: [phone]  Fax: [phone]  Follow Up Time:

## 2019-03-13 NOTE — LACTATION INITIAL EVALUATION - LACTATION INTERVENTIONS
initiate skin to skin/initiate hand expression routine/assisted with deep latch and positioning  discussed  signs  of  effective  feeding and  swallowing.  discussed  compression at  breast when  nbn  stops  drinking  and  is  still sucking.  instructed  to offer both  breast at a feeding ,feed on cue and safe  skin to skin.  reviewed  strategies  to  bring  out  nipple

## 2019-03-13 NOTE — PROGRESS NOTE ADULT - NSICDXPROBLEM_GEN_ALL_CORE_FT
PROBLEM DIAGNOSES  Problem:  delivery delivered  Assessment and Plan: - Continue regular diet.  - Increase ambulation.  - Continue motrin, tylenol, oxycodone PRN for pain control.    - F/u AM CBC  [place] PGY-1

## 2019-03-14 RX ORDER — OXYCODONE HYDROCHLORIDE 5 MG/1
5 TABLET ORAL
Qty: 0 | Refills: 0 | Status: DISCONTINUED | OUTPATIENT
Start: 2019-03-14 | End: 2019-03-15

## 2019-03-14 RX ORDER — OXYCODONE HYDROCHLORIDE 5 MG/1
5 TABLET ORAL EVERY 4 HOURS
Qty: 0 | Refills: 0 | Status: DISCONTINUED | OUTPATIENT
Start: 2019-03-14 | End: 2019-03-15

## 2019-03-14 RX ADMIN — Medication 975 MILLIGRAM(S): at 02:30

## 2019-03-14 RX ADMIN — Medication 600 MILLIGRAM(S): at 19:06

## 2019-03-14 RX ADMIN — HEPARIN SODIUM 5000 UNIT(S): 5000 INJECTION INTRAVENOUS; SUBCUTANEOUS at 05:40

## 2019-03-14 RX ADMIN — Medication 975 MILLIGRAM(S): at 18:26

## 2019-03-14 RX ADMIN — HEPARIN SODIUM 5000 UNIT(S): 5000 INJECTION INTRAVENOUS; SUBCUTANEOUS at 18:26

## 2019-03-14 RX ADMIN — Medication 600 MILLIGRAM(S): at 18:26

## 2019-03-14 RX ADMIN — Medication 975 MILLIGRAM(S): at 13:00

## 2019-03-14 RX ADMIN — Medication 325 MILLIGRAM(S): at 12:02

## 2019-03-14 RX ADMIN — Medication 975 MILLIGRAM(S): at 19:06

## 2019-03-14 RX ADMIN — Medication 1 TABLET(S): at 12:02

## 2019-03-14 RX ADMIN — Medication 975 MILLIGRAM(S): at 12:02

## 2019-03-14 RX ADMIN — Medication 975 MILLIGRAM(S): at 02:00

## 2019-03-14 NOTE — PROGRESS NOTE ADULT - ATTENDING COMMENTS
patient seen and examined at bedside.  Agree with plan above.  dc home in am - follow up in office in 2 weeks.
patient seen and examined at bedside.  pt is breast feeding; OOB ambulation - denies flatus or bowel movement; pos voids.    Agree with plan above.    breast pump to be ordered  pain management prn  dc planning

## 2019-03-14 NOTE — PROGRESS NOTE ADULT - SUBJECTIVE AND OBJECTIVE BOX
Patient assessed at 0912.  Subjective  Pain: Patient denies any pain at the time of assessment. Pain being managed well by pain management protocol.  Complaints: None. Patient denies any headache, blur vision, dizziness and/or weakness/fatigue. Patient reports abdominal incision pain relieved by pain medication  Milestones:  Alert and orientedx3. Out of bed ambulating. Positive flatus. Positive bowel movement. Voiding.  Tolerating a regular diet.  Infant feeding: breastfeeding  Feeding related issues and/or concerns: sore nipples referred to lactation    Objective  Vital Signs:  Vital Signs Last 24 Hrs  T(C): 36.7 (14 Mar 2019 06:33), Max: 36.7 (14 Mar 2019 06:33)  T(F): 98 (14 Mar 2019 06:33), Max: 98 (14 Mar 2019 06:33)  HR: 99 (14 Mar 2019 06:33) (91 - 99)  BP: 108/79 (14 Mar 2019 06:33) (103/65 - 111/85)  BP(mean): --  RR: 17 (14 Mar 2019 06:33) (17 - 19)  SpO2: 100% (14 Mar 2019 06:33) (98% - 100%)    Labs:                        10.9   12.75 )-----------( 219      ( 12 Mar 2019 18:43 )             33.5     Blood Type: Opositive  Rubella: Immune  RPR: nonreactive      Medications  MEDICATIONS  (STANDING):  acetaminophen   Tablet .. 975 milliGRAM(s) Oral every 6 hours  diphtheria/tetanus/pertussis (acellular) Vaccine (ADAcel) 0.5 milliLiter(s) IntraMuscular once  ferrous    sulfate 325 milliGRAM(s) Oral daily  heparin  Injectable 5000 Unit(s) SubCutaneous every 12 hours  ibuprofen  Tablet. 600 milliGRAM(s) Oral every 6 hours  oxyCODONE    IR 5 milliGRAM(s) Oral every 3 hours  prenatal multivitamin 1 Tablet(s) Oral daily    MEDICATIONS  (PRN):  diphenhydrAMINE 25 milliGRAM(s) Oral every 6 hours PRN Itching  docusate sodium 100 milliGRAM(s) Oral two times a day PRN Stool Softening  glycerin Suppository - Adult 1 Suppository(s) Rectal at bedtime PRN Constipation  lanolin Ointment 1 Application(s) Topical every 3 hours PRN Sore Nipples  oxyCODONE    IR 5 milliGRAM(s) Oral every 4 hours PRN Severe Pain (7 - 10)  simethicone 80 milliGRAM(s) Chew every 4 hours PRN Gas    Assessment  24y/o     Day#2    repeat post-operative  section delivery                                 Condition: Stable  Past Medical History significant for: hyperlipidemia diet controlled, QuantiFeron positive chest x-ray 19 clear lungs  Current Issues:  Lung sounds clear bilaterally.  Breasts: soft, nontender  Nipples: cracked  Abdomen: Soft, nondistended and nontender. Bowel sounds present. Fundus firm  Abdominal incision: Clean, dry and intact with steri strips.   Vaginal: Lochia light rubra  Extremities: Slight edema noted bilaterally and equal to lower extremities, nontender with no erythremic areas noted. Positive pedal pulses  Other relevant physical exam findings: none    Plan  Continue routine post-operative and postpartum care  Increase ambulation, analgesia PRN and pain medication protocol standing oxycodone, ibuprofen and acetaminophen.  Encourage use of abdominal binder for support and incentive spirometer.   Discussed breast/nipple care and breastfeeding.   Reinforce pain medication regimen for abdominal incision pain .

## 2019-03-15 VITALS
TEMPERATURE: 98 F | RESPIRATION RATE: 18 BRPM | SYSTOLIC BLOOD PRESSURE: 101 MMHG | HEART RATE: 80 BPM | OXYGEN SATURATION: 98 % | DIASTOLIC BLOOD PRESSURE: 60 MMHG

## 2019-03-15 RX ADMIN — HEPARIN SODIUM 5000 UNIT(S): 5000 INJECTION INTRAVENOUS; SUBCUTANEOUS at 06:00

## 2019-06-12 ENCOUNTER — EMERGENCY (EMERGENCY)
Facility: HOSPITAL | Age: 24
LOS: 1 days | Discharge: HOME | End: 2019-06-12
Admitting: EMERGENCY MEDICINE
Payer: MEDICAID

## 2019-06-12 VITALS
TEMPERATURE: 98 F | HEART RATE: 93 BPM | OXYGEN SATURATION: 100 % | RESPIRATION RATE: 14 BRPM | SYSTOLIC BLOOD PRESSURE: 110 MMHG | DIASTOLIC BLOOD PRESSURE: 74 MMHG

## 2019-06-12 DIAGNOSIS — Z98.891 HISTORY OF UTERINE SCAR FROM PREVIOUS SURGERY: Chronic | ICD-10-CM

## 2019-06-12 LAB
APPEARANCE UR: CLEAR — SIGNIFICANT CHANGE UP
BACTERIA # UR AUTO: NEGATIVE — SIGNIFICANT CHANGE UP
BILIRUB UR-MCNC: NEGATIVE — SIGNIFICANT CHANGE UP
BLOOD UR QL VISUAL: SIGNIFICANT CHANGE UP
COLOR SPEC: YELLOW — SIGNIFICANT CHANGE UP
GLUCOSE UR-MCNC: NEGATIVE — SIGNIFICANT CHANGE UP
HYALINE CASTS # UR AUTO: SIGNIFICANT CHANGE UP
KETONES UR-MCNC: NEGATIVE — SIGNIFICANT CHANGE UP
LEUKOCYTE ESTERASE UR-ACNC: NEGATIVE — SIGNIFICANT CHANGE UP
NITRITE UR-MCNC: NEGATIVE — SIGNIFICANT CHANGE UP
PH UR: 6 — SIGNIFICANT CHANGE UP (ref 5–8)
PROT UR-MCNC: 50 — SIGNIFICANT CHANGE UP
RBC CASTS # UR COMP ASSIST: SIGNIFICANT CHANGE UP (ref 0–?)
SP GR SPEC: 1.04 — SIGNIFICANT CHANGE UP (ref 1–1.04)
SQUAMOUS # UR AUTO: SIGNIFICANT CHANGE UP
UROBILINOGEN FLD QL: SIGNIFICANT CHANGE UP
WBC UR QL: SIGNIFICANT CHANGE UP (ref 0–?)

## 2019-06-12 PROCEDURE — 99284 EMERGENCY DEPT VISIT MOD MDM: CPT

## 2019-06-12 RX ORDER — FAMOTIDINE 10 MG/ML
20 INJECTION INTRAVENOUS ONCE
Refills: 0 | Status: COMPLETED | OUTPATIENT
Start: 2019-06-12 | End: 2019-06-12

## 2019-06-12 RX ORDER — ONDANSETRON 8 MG/1
4 TABLET, FILM COATED ORAL ONCE
Refills: 0 | Status: COMPLETED | OUTPATIENT
Start: 2019-06-12 | End: 2019-06-12

## 2019-06-12 RX ORDER — SODIUM CHLORIDE 9 MG/ML
1000 INJECTION INTRAMUSCULAR; INTRAVENOUS; SUBCUTANEOUS ONCE
Refills: 0 | Status: COMPLETED | OUTPATIENT
Start: 2019-06-12 | End: 2019-06-12

## 2019-06-12 RX ADMIN — FAMOTIDINE 20 MILLIGRAM(S): 10 INJECTION INTRAVENOUS at 23:39

## 2019-06-12 RX ADMIN — ONDANSETRON 4 MILLIGRAM(S): 8 TABLET, FILM COATED ORAL at 23:40

## 2019-06-12 RX ADMIN — SODIUM CHLORIDE 1000 MILLILITER(S): 9 INJECTION INTRAMUSCULAR; INTRAVENOUS; SUBCUTANEOUS at 23:40

## 2019-06-12 NOTE — ED PROVIDER NOTE - CLINICAL SUMMARY MEDICAL DECISION MAKING FREE TEXT BOX
23 year old female with no pertinent PMHx, PSHx of  pw 3 days of diffuse abdominal pain, nausea, subjective chills/fevers, and watery diarrhea.  Plan: labs, ct r/o colitis, ua r/o UTI

## 2019-06-12 NOTE — ED ADULT NURSE NOTE - OBJECTIVE STATEMENT
Pt received to the ED c/o of generalized abd pain x 3days associated with nausea, fever and chills. pt a&ox4 and ambulatory at baseline, skin intact, respirations even and unlabored, abd soft and non-distended. 20G placed in pt's rt arm, labs drawn and sent, pt medicated as per ordered. will continue to monitor.

## 2019-06-12 NOTE — ED PROVIDER NOTE - NSFOLLOWUPINSTRUCTIONS_ED_ALL_ED_FT
Eat a bland diet - bananas, rice, applesauce and toast. Drink plenty of fluids - stay hydrated. Please continue all home medications as directed. See your regular doctor within 72 hours for follow-up care. Return to ER for new or worsening symptoms.

## 2019-06-12 NOTE — ED PROVIDER NOTE - NSTIMEPROVIDERCAREINITIATE_GEN_ER
Discharge Summary


Admission Date


Dec 16, 2017 at 13:25


Discharge Date:  Dec 19, 2017


Admitting Diagnosis





Subdural hematoma





(1) Nontraumatic acute subdural hemorrhage


Diagnosis:  Principal


ICD Codes:  I62.01 - Nontraumatic acute subdural hemorrhage


Status:  Acute


(2) Facial fracture due to fall


Diagnosis:  Secondary


ICD Codes:  S02.92XA - Unspecified fracture of facial bones, initial encounter 

for closed fracture; W19.XXXA - Unspecified fall, initial encounter


Status:  Acute


(3) Hypothyroidism


Diagnosis:  Secondary


ICD Codes:  E03.9 - Hypothyroidism, unspecified


Status:  Chronic


(4) Hyperlipidemia


Diagnosis:  Secondary


ICD Codes:  E78.5 - Hyperlipidemia, unspecified


Status:  Chronic


(5) Essential tremor


Diagnosis:  Secondary


ICD Codes:  G25.0 - Essential tremor


Status:  Chronic


Consultants


Dr. Sivakumar Jenkins - Neurosurgery


Dr. Darren Zhao - Oral Maxillofacial Surgery


Brief History


HPI from the [facility] H&P:


"81 year old  female with past medical history significant for 

hypertension, dyslipidemia, hypothyroidism, anxiety and depression.  She was 

sent from her primary care doctor's office for a CT of the head due to a fall 

sustained 2 days ago.  She slipped and fell and hit her face on a bedside 

table.  Post fall she had a headache but hasn't resolved now.  Stat CT of the 

head showed two left parafalcine subdural hemorrhages within the left parietal 

region measuring 2.3 x 0.6 cm and 0.9 x 0.4 cm. CT of the facial bones showed 

acute fractures involving the lateral wall of left orbit, anterior, medial and 

inferior walls of the left maxillary sinus. I evaluated the patient in the ICU. 

Denies headache or any focal weakness. Had no seizures post fall. Patient is 

hypertensive. When necessary hydralazine ordered and home antihypertensives had 

been started. Neurosurgeon has seen the patient and as well as oral and 

maxillofacial surgeon. Follow-up CT of the head planned for a.m."


CBC/BMP:  


12/17/17 0420 12/17/17 0420





Significant Findings





Laboratory Tests








Test


  12/16/17


16:00 12/17/17


04:20


 


Monocytes (%) (Auto)


  


  10.6 %


(0.0-8.0)


 


Chloride Level


  


  108 MEQ/L


()


 


Estimat Glomerular Filtration


Rate 


  80 ML/MIN


(>89)








Imaging





Last Impressions








Head CT 12/17/17 0600 Signed





Impressions: 





 Service Date/Time:  Sunday, December 17, 2017 06:00 - CONCLUSION:  Small left 





 frontal perifalcine subdural blood is unchanged. No new blood. No midline 

shift. 





     Tate Garcia MD 


 


Wrist X-Ray 12/16/17 0000 Signed





Impressions: 





 Service Date/Time:  Saturday, December 16, 2017 11:16 - CONCLUSION:  1. No 

acute 





 fracture or dislocation.  2. Possible old ununited ulnar styloid fracture.  3. 





 Diffuse osteoporosis.     Perez Squires MD 


 


Maxillofacial CT 12/16/17 0000 Signed





Impressions: 





 Service Date/Time:  Saturday, December 16, 2017 10:23 - CONCLUSION:  1. Acute 





 fractures involving the lateral wall of left orbit, as well as the anterior, 





 medial and inferior walls of the left maxillary sinus.  2. Minimal mucosal 





 thickening is noted involving the maxillary sinus     Perez Squires MD 


 


Hand X-Ray 12/16/17 0000 Signed





Impressions: 





 Service Date/Time:  Saturday, December 16, 2017 11:13 - CONCLUSION:  1. No 

acute 





 fracture or dislocation.  2. Diffuse osteoporosis.  3. No significant joint 





 space narrowing noted.     Perez Squires MD 


 


Cervical Spine CT 12/16/17 0000 Signed





Impressions: 





 Service Date/Time:  Saturday, December 16, 2017 10:23 - CONCLUSION:  1. No 

acute 





 fracture or prevertebral soft tissue swelling.  2. Moderate spinal stenosis at 





 C5-6 and mild spinal stenosis at C4-5 and C6-7.  3. Moderate bilateral 

foraminal 





 narrowing at C3-4, C4-5, C5-6 and C6-7 and mild bilateral foraminal narrowing 

at 





 C7-T1.          Perez Squires MD 








PE at Discharge


General: NAD, AAOx3


Chest: CTA bilaterally


Cardiac: Regular


Abd: +BS, soft ND/NT


Ext: No edema


Hospital Course


Acute subdural hemorrhage


Pt is an 80 y/o WF with hypertension, dyslipidemia, hypothyroidism, anxiety and 

depression. Pt was sent from her primary care doctor's office on 12/16 for a CT 

of the head to evaluate after a fall she sustained 2 days prior to admission.  

She had slipped and fell and hit her face on a bedside table.  CT of the head (

12/16) showed two left parafalcine subdural hemorrhages within the left 

parietal region measuring 2.3 x 0.6 cm and 0.9 x 0.4 cm. CT of the facial bones 

showed acute fractures involving the lateral wall of left orbit, anterior, 

medial and inferior walls of the left maxillary sinus. Pt was admitted to the 

ICU with Neurosurgical consultation as well as oral and maxillofacial surgeon. 

Follow-up CT of the head (12/17) with small left frontal parafalcine subdural 

blood is unchanged. No new blood. No midline shift. Pt ambulated with PT on 12/ 18 and was recommended for HHC/PT, but she only ambulated about 10' x 2 and she 

lives alone. Neurosurgery re-evaluated the pt on 12/18 and they cleared the pt 

for discharge and also felt that she would benefit from SNF as she lives at 

home by herself.  The case was discussed between Dr. Tate and the pts son, 

Cirilo, and he is agreeable to SNF placement. Pt is planned to [facility] 

at discharge. 





Facial fracture due to fall


Pt was evaluated by OMFS during this admission for left maxillary sinus 

fracture which was felt to be stable with no need for surgical intervention. 





Hypothyroidism


Cont. home meds





Hyperlipidemia


Cont. home meds





Essential tremor


Pt felt that her speech was worse more recently which she attributes to her 

essential tremor. She follows with Dr. Bender as an outpt and plans to 

followup with him





She will followup with her PCP, Dr. Holbrook, 1 week after discharge from SNF. Dr. Holbrook will likely follow the pt at rehab as well. 


Pt will need to followup with Dr. Jenkins in 2 weeks.


Pt Condition on Discharge:  Stable


Discharge Disposition:  Discharge to SNF


Discharge Instructions


DIET: Follow Instructions for:  Heart Healthy Diet


Activities you can perform:  Regular-No Restrictions


Follow up Referrals:  


Neurology - 2 Weeks with Ascencion Portillo M.d.


Neurosurgery - 2 Weeks with Sivakumar Jenkins MD


PCP Follow-up - 1 Week with Dr. Min Holbrook





Changed Medications:  


Clonazepam (Clonazepam) 1 Mg Tab


1 MG PO BID PRN for ANXIETY, #60 TAB 0 Refills (Medication details modified)





 


Continued Medications:  


Amlodipine (Amlodipine) 5 Mg Tab


5 MG PO DAILY for Blood Pressure Management, #30 TAB 0 Refills





Aspirin DR (Aspirin EC) 81 Mg Tabdr


81 MG PO DAILY, TAB 0 Refills





Calcium Carbonate-Cholecalciferol (Calcium 500 +D) 500-400 Mg-Unit Tab


1 TAB PO DAILY for Calcium Supplement, TAB 0 Refills





Levothyroxine (Levoxyl) 50 Mcg Tab


50 MCG PO DAILY for Thyroid, #30 TAB 0 Refills





Lisinopril (Lisinopril) 20 Mg Tab


20 MG PO DAILY, #30 TAB 0 Refills





Lovastatin (Lovastatin) 10 Mg Tab


10 MG PO DAILY for Cholesterol Management, #30 TAB 0 Refills





Metoprolol Succinate ER 24 HR (Toprol XL) 100 Mg Tab


100 MG PO HS, #30 TAB 0 Refills





Metronidazole Topical 0.75% ([place] Topical 0.75%) 0.75% Gel


1 APPLIC TOPICAL BID for Infection, #1 TUBE 0 Refills





Omeprazole (Omeprazole) 20 Mg Tab


20 MG PO DAILY, #30 TAB 0 Refills





 


Discontinued Medications:  


Ascorbic Acid (Ascorbic Acid) 500 Mg Tab


500 MG PO DAILY, TAB





Cod Liver Oil (Cod Liver Oil) 5,000-500 Unit/5Ml Oil


5 ML PO DAILY





Temazepam (Temazepam) 15 Mg Cap


15 MG PO HS PRN for INSOMNIA, #30 CAP 0 Refills

















Bambi Ashley Dec 19, 2017 15:04 12-Jun-2019 22:51

## 2019-06-12 NOTE — ED ADULT TRIAGE NOTE - CHIEF COMPLAINT QUOTE
Pt fevers, chills, generalized adnominal discomfort, decreased PO intake, nausea. Denies vomiting or diarrhea. Denies medical hx aside from C-sections, last 03/2019.

## 2019-06-12 NOTE — ED PROVIDER NOTE - OBJECTIVE STATEMENT
23 year old female with no pertinent PMHx, PSHx of  pw 3 days of diffuse abdominal pain, nausea, subjective chills/fevers, and watery diarrhea. Pain is constant, cramping, radiates to the back, worse with eating food. Endorses that she has eaten unwashed fruits within the past week. No sick contacts/foreign travel. States that she hasn't had a regular menses since giving birth in March. Denies CP, SOB, dysuria, hematuria, melena, hematochezia.

## 2019-06-13 PROBLEM — Z86.59 PERSONAL HISTORY OF OTHER MENTAL AND BEHAVIORAL DISORDERS: Chronic | Status: ACTIVE | Noted: 2019-02-26

## 2019-06-13 PROBLEM — Z86.39 PERSONAL HISTORY OF OTHER ENDOCRINE, NUTRITIONAL AND METABOLIC DISEASE: Chronic | Status: ACTIVE | Noted: 2019-02-26

## 2019-06-13 PROBLEM — E66.9 OBESITY, UNSPECIFIED: Chronic | Status: ACTIVE | Noted: 2019-02-26

## 2019-06-13 LAB
ALBUMIN SERPL ELPH-MCNC: 4.5 G/DL — SIGNIFICANT CHANGE UP (ref 3.3–5)
ALP SERPL-CCNC: 108 U/L — SIGNIFICANT CHANGE UP (ref 40–120)
ALT FLD-CCNC: 63 U/L — HIGH (ref 4–33)
ANION GAP SERPL CALC-SCNC: 15 MMO/L — HIGH (ref 7–14)
AST SERPL-CCNC: 48 U/L — HIGH (ref 4–32)
BASOPHILS # BLD AUTO: 0.02 K/UL — SIGNIFICANT CHANGE UP (ref 0–0.2)
BASOPHILS NFR BLD AUTO: 0.4 % — SIGNIFICANT CHANGE UP (ref 0–2)
BILIRUB SERPL-MCNC: 0.4 MG/DL — SIGNIFICANT CHANGE UP (ref 0.2–1.2)
BUN SERPL-MCNC: 9 MG/DL — SIGNIFICANT CHANGE UP (ref 7–23)
CALCIUM SERPL-MCNC: 9.1 MG/DL — SIGNIFICANT CHANGE UP (ref 8.4–10.5)
CHLORIDE SERPL-SCNC: 103 MMOL/L — SIGNIFICANT CHANGE UP (ref 98–107)
CO2 SERPL-SCNC: 21 MMOL/L — LOW (ref 22–31)
CREAT SERPL-MCNC: 0.57 MG/DL — SIGNIFICANT CHANGE UP (ref 0.5–1.3)
EOSINOPHIL # BLD AUTO: 0.03 K/UL — SIGNIFICANT CHANGE UP (ref 0–0.5)
EOSINOPHIL NFR BLD AUTO: 0.7 % — SIGNIFICANT CHANGE UP (ref 0–6)
GLUCOSE SERPL-MCNC: 82 MG/DL — SIGNIFICANT CHANGE UP (ref 70–99)
HCT VFR BLD CALC: 37.8 % — SIGNIFICANT CHANGE UP (ref 34.5–45)
HGB BLD-MCNC: 12.6 G/DL — SIGNIFICANT CHANGE UP (ref 11.5–15.5)
IMM GRANULOCYTES NFR BLD AUTO: 0.2 % — SIGNIFICANT CHANGE UP (ref 0–1.5)
LYMPHOCYTES # BLD AUTO: 1.4 K/UL — SIGNIFICANT CHANGE UP (ref 1–3.3)
LYMPHOCYTES # BLD AUTO: 30.9 % — SIGNIFICANT CHANGE UP (ref 13–44)
MCHC RBC-ENTMCNC: 29.1 PG — SIGNIFICANT CHANGE UP (ref 27–34)
MCHC RBC-ENTMCNC: 33.3 % — SIGNIFICANT CHANGE UP (ref 32–36)
MCV RBC AUTO: 87.3 FL — SIGNIFICANT CHANGE UP (ref 80–100)
MONOCYTES # BLD AUTO: 0.55 K/UL — SIGNIFICANT CHANGE UP (ref 0–0.9)
MONOCYTES NFR BLD AUTO: 12.1 % — SIGNIFICANT CHANGE UP (ref 2–14)
NEUTROPHILS # BLD AUTO: 2.52 K/UL — SIGNIFICANT CHANGE UP (ref 1.8–7.4)
NEUTROPHILS NFR BLD AUTO: 55.7 % — SIGNIFICANT CHANGE UP (ref 43–77)
NRBC # FLD: 0 K/UL — SIGNIFICANT CHANGE UP (ref 0–0)
PLATELET # BLD AUTO: 239 K/UL — SIGNIFICANT CHANGE UP (ref 150–400)
PMV BLD: 9.6 FL — SIGNIFICANT CHANGE UP (ref 7–13)
POTASSIUM SERPL-MCNC: 3.5 MMOL/L — SIGNIFICANT CHANGE UP (ref 3.5–5.3)
POTASSIUM SERPL-SCNC: 3.5 MMOL/L — SIGNIFICANT CHANGE UP (ref 3.5–5.3)
PROT SERPL-MCNC: 7.8 G/DL — SIGNIFICANT CHANGE UP (ref 6–8.3)
RBC # BLD: 4.33 M/UL — SIGNIFICANT CHANGE UP (ref 3.8–5.2)
RBC # FLD: 12.5 % — SIGNIFICANT CHANGE UP (ref 10.3–14.5)
SODIUM SERPL-SCNC: 139 MMOL/L — SIGNIFICANT CHANGE UP (ref 135–145)
WBC # BLD: 4.53 K/UL — SIGNIFICANT CHANGE UP (ref 3.8–10.5)
WBC # FLD AUTO: 4.53 K/UL — SIGNIFICANT CHANGE UP (ref 3.8–10.5)

## 2019-06-13 PROCEDURE — 74177 CT ABD & PELVIS W/CONTRAST: CPT | Mod: 26

## 2019-06-14 LAB
BACTERIA UR CULT: SIGNIFICANT CHANGE UP
SPECIMEN SOURCE: SIGNIFICANT CHANGE UP

## 2021-02-05 ENCOUNTER — EMERGENCY (EMERGENCY)
Facility: HOSPITAL | Age: 26
LOS: 1 days | Discharge: HOME | End: 2021-02-05
Admitting: EMERGENCY MEDICINE
Payer: COMMERCIAL

## 2021-02-05 VITALS
SYSTOLIC BLOOD PRESSURE: 121 MMHG | TEMPERATURE: 98 F | HEIGHT: 60 IN | DIASTOLIC BLOOD PRESSURE: 76 MMHG | RESPIRATION RATE: 16 BRPM | HEART RATE: 101 BPM | WEIGHT: 115.96 LBS

## 2021-02-05 VITALS
DIASTOLIC BLOOD PRESSURE: 79 MMHG | SYSTOLIC BLOOD PRESSURE: 111 MMHG | TEMPERATURE: 99 F | RESPIRATION RATE: 17 BRPM | HEART RATE: 94 BPM | OXYGEN SATURATION: 100 %

## 2021-02-05 DIAGNOSIS — Z98.891 HISTORY OF UTERINE SCAR FROM PREVIOUS SURGERY: Chronic | ICD-10-CM

## 2021-02-05 PROCEDURE — 99282 EMERGENCY DEPT VISIT SF MDM: CPT

## 2021-02-05 RX ORDER — CEPHALEXIN 500 MG
1 CAPSULE ORAL
Qty: 14 | Refills: 0
Start: 2021-02-05 | End: 2021-02-11

## 2021-02-05 NOTE — ED PROVIDER NOTE - PHYSICAL EXAMINATION
Vital signs reviewed.   CONSTITUTIONAL: Well-appearing; well-nourished; in no apparent distress. Non-toxic appearing.   HEAD: six staples over laceration on parietal scalp right of midline; laceration site is warm, dry, and intact with no evidence of infection.  EYES: PERRL, EOM intact and w/o pain, conjunctiva pink and w/o discharge.  ENT: Nose midline; no rhinorrhea; normal pharynx with no tonsillar hypertrophy, no erythema, no exudate, no lymphadenopathy.  NECK/LYMPH: Supple; non-tender; no cervical lymphadenopathy.  CARD: RRR, S1/S2  RESP: Normal chest excursion with respiration; breath sounds clear and equal bilaterally; no wheezes, rhonchi, or rales.  ABD/GI: soft, non-distended; non-tender; no palpable organomegaly, no pulsatile mass.  EXT/MS: TTP on left deltoid with warmth and approximately 5cm diameter region of induration at injection site for tetanus booster; moves all extremities; distal pulses are normal, no pedal edema.  SKIN: Normal for age and race; warm; dry; good turgor; no apparent lesions or exudate noted.  NEURO: Awake, alert, oriented x 3, no apparent focal deficits, CN II-XII grossly intact.  PSYCH: Normal mood; appropriate affect. Vital signs reviewed.   CONSTITUTIONAL: Well-appearing; well-nourished; in no apparent distress. Non-toxic appearing.   HEAD: six staples over laceration on parietal scalp right of midline; laceration site is warm, dry, and intact with no evidence of infection.  EYES: PERRL, EOM intact and w/o pain, conjunctiva pink and w/o discharge.  ENT: Nose midline; no rhinorrhea; normal pharynx with no tonsillar hypertrophy, no erythema, no exudate, no lymphadenopathy.  NECK/LYMPH: Supple; non-tender; no cervical lymphadenopathy.  CARD: RRR, S1/S2  RESP: Normal chest excursion with respiration; breath sounds clear and equal bilaterally; no wheezes, rhonchi, or rales.  ABD/GI: soft, non-distended; non-tender; no palpable organomegaly, no pulsatile mass.  EXT/MS: TTP on left deltoid with warmth and approximately 5cm diameter region of induration at injection site for tetanus booster; moves all extremities; distal pulses are normal, no pedal edema.  SKIN: Normal for age and race; warm; dry; good turgor; no apparent lesions or exudate noted.  NEURO: Awake, alert, oriented x 3, no apparent focal deficits, CN II-XII grossly intact. EOMS full. ms 5/5 bl upp/low extremities  PSYCH: Normal mood; appropriate affect..

## 2021-02-05 NOTE — ED PROVIDER NOTE - NSFOLLOWUPINSTRUCTIONS_ED_ALL_ED_FT
REST, NO STRENUOUS ACTIVITY  TAKE TYLENOL 975MG EVERY 6-8 HOURS AS NEEDED FOR PAIN  ICE TO ARM 3 TIMES A DAY AS DIRECTED  TAKE ANTIBIOTICS IF SWELLING/REDNESS HASN'T RESOLVED IN 48 HOURS  IF SYMPTOMS PERSIST - PLEASE FOLLOW UP WITH NEUROLOGY - REFERRAL LIST GIVEN  RETURN TO ER FOR WORSENING SYMPTOMS

## 2021-02-05 NOTE — ED PROVIDER NOTE - CLINICAL SUMMARY MEDICAL DECISION MAKING FREE TEXT BOX
A: 26 y/o female with no PMH, presents s/p sustaining head injury with LOC on Wednesday 02/03/21. Laceration on parietal scalp has six staples. Left deltoid is TTP with warmth and approx 5cm diameter zone of induration. Pt likely sustained mild concussion. Low suspicion for intracranial/epidural bleed given clinical picture and Pt not experiencing any post-injury LOC or n/v.    P: Will misbah zone of induration and educate Pt regarding concussion care/management. Will prescribe Keflex and instruct Pt to start taking if left arm symptoms to not resolve by Monday 02/08/21. D/c Pt home with instructions to follow up with PCP or urgent care for staple removal and reassessment of left arm injection site reaction. A: 24 y/o female with no PMH, presents s/p sustaining head injury with LOC on Wednesday 02/03/21. Laceration on parietal scalp has six staples. Left deltoid is TTP with warmth and approx 5cm diameter zone of induration. Pt likely sustained mild concussion. Low suspicion for intracranial/epidural bleed given clinical picture (neg [place] head ct rules) and Pt not experiencing any post-injury LOC or n/v.    P: Will misbah zone of induration and educate Pt regarding concussion care/management. Will prescribe Keflex and instruct Pt to start taking if left arm symptoms to not resolve by Monday 02/08/21. D/c Pt home with instructions to follow up with PCP or urgent care for staple removal and reassessment of left arm injection site reaction. Given strict return to ER precautions.

## 2021-02-05 NOTE — ED PROVIDER NOTE - OBJECTIVE STATEMENT
REVA Brooks  24 y/o female with no PMH, presents s/p sustaining head injury with LOC on Wednesday 02/03/21. Reports visiting urgent care after injury in which Pt's head laceration was stapled and a tetanus booster administered. Pt presents today due to dizziness with associated episodes of facial flushing and left arm pain at site of tetanus booster. Since original injury and LOC, Pt has not experienced any episodes of syncope, or n/v. REVA Brooks  26 y/o female with no PMH, presents s/p sustaining head injury with LOC on Wednesday 02/03/21. Reports visiting urgent care the morning following injury in which Pt's head laceration was stapled and a tetanus booster administered. Pt presents today due to dizziness and head pain with associated episodes of facial flushing and left arm pain at site of tetanus booster. Since original injury and LOC, Pt has not experienced any episodes of syncope, or n/v. Pt denies any focal deficit or aphasia. REVA Brooks  24 y/o female with no PMH, presents s/p sustaining head injury with LOC on Wednesday 02/03/21. Reports visiting urgent care the morning following injury in which Pt's head laceration was stapled and a tetanus booster administered. Pt presents today due to dizziness and head pain with associated episodes of facial flushing and left arm pain at site of tetanus booster. Since original injury and LOC, Pt has not experienced any episodes of syncope, or n/v. Pt denies any focal deficit or aphasia. Took tylenol PM with some relief.

## 2021-02-05 NOTE — ED ADULT TRIAGE NOTE - CHIEF COMPLAINT QUOTE
s/p head trauma on Wednesday.  Pt endorses that while reaching for a cup, a heavier cup fell on her head.  Pt endorses everything was black, but could hear around her.  Pt endorses that [facility] and received staples to right side of head, and a shot in the left arm.  Present today c/o headache and left arm pain.

## 2021-02-05 NOTE — ED ADULT NURSE NOTE - OBJECTIVE STATEMENT
Receive pt. in Intake 13 alert and oriented x 4, presenting to the ER with complaints of a headache. Pt. stated " on Wednesday a big cup fell from the cupboard and hit me on my head I got stitches and it is still hurting". Top of head with stitches no redness, edema or drainage. No c/o change in vision, will continue to monitor.

## 2021-09-10 NOTE — ED ADULT TRIAGE NOTE - MODE OF ARRIVAL
Walk in Private Auto Tazorac Counseling:  Patient advised that medication is irritating and drying.  Patient may need to apply sparingly and wash off after an hour before eventually leaving it on overnight.  The patient verbalized understanding of the proper use and possible adverse effects of tazorac.  All of the patient's questions and concerns were addressed.

## 2021-09-30 NOTE — ED PROVIDER NOTE - CHPI ED SYMPTOMS NEG
Bed: H07  Expected date: 9/30/21  Expected time: 12:16 AM  Means of arrival:   Comments:  randy  
no hematuria/no vomiting/no blood in stool/no burning urination

## 2022-01-01 NOTE — ED ADULT NURSE NOTE - NS ED NOTE ABUSE SUSPICION NEGLECT YN
Mother signed infant's discharge instructions and was given a copy for her records. Final ID band check completed with mother and infant. Correct ID confirmed. Hugs tag disabled and removed. No

## 2023-07-09 NOTE — ED ADULT TRIAGE NOTE - CHIEF COMPLAINT QUOTE
Pt brought in by EMS 38 weeks pregnant complaining of pelvic and back pain. Pt to be seen in L&D. Pt noted to have 15G R AC . Admission

## 2023-07-14 NOTE — ED PROVIDER NOTE - IV ALTEPLASE EXCL ABS HIDDEN
show Electrodesiccation Text: The wound bed was treated with electrodesiccation after the biopsy was performed.

## 2024-02-20 NOTE — H&P PST ADULT - VENOUS THROMBOEMBOLISM AGE
HR: 99. Offered to lay down and rest on the table. He preferred to sit on the chair. Offered short acting anti-anxiety medication, and discussed Buspar and Hydroxyzine. He declined taking any medication stating that none of them has been working for him. Discussed about his anxiety and fidgety movements and offered adding another medication to Escitalopram. He stated that he has been on this medication for 22 years, tried different medications and Escitalopram is the one helping better than others.   Asked when he takes Metoprolol 25 mg. He stated that takes only once daily and whenever he remembers. Could be in the morning or evening.   Discussed about medication compliance is very important managing his symptoms. 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  
Left ear infection without suppuration. Given Z-pack since it will be shorter duration and once daily to increase the chance of compliance. Instructed not to drink with alcohol. Discussed how to take the medication, 2 tabs today, then 1 tab daily for 4 days or until gone. Advised him to follow up in 1 week to check his ear.  Advised to take OTC Tylenol for his low grade fever, chills, and muscle and joint aches. Advised drink plenty of water, eat 3 healthy meals and rest as possible.     
PHQ-9 Total Score: 8 (2/20/2024  9:09 AM)  Thoughts that you would be better off dead, or of hurting yourself in some way: 0 (2/20/2024  9:09 AM)        2/20/2024     9:10 AM   DIAMOND 7 SCORE   DIAMOND-7 Total Score 13   Discussed about additional anxiety medication either short term or adding a second medication. He denied. Offered giving him 1 or 2 days off work so that he can get some sleep and rest. He denied and stated that he can't afford it bc he had to take time off during Covid.   Discussed about his sleeping pattern and seen pretty restless. Offered sleep aid. He declined sleep medication, and stated that if he needs it he could take 1 tab benadryl. Discussed it is good to start with OTC medication such as Benadryl or Melatonin as needed.   He stated \"I am fine, I just need to change my family doctor over here\".  Reviewed his lab work from 02/13/2023. CMP within normal limits. CBC were abnormal. He stated that he left the truck is running and wanted to leave and come back another time for blood draw.  Advised him to do follow up in 1 week for re-evaluation and blood draw.    
(0) age 40 years or less

## 2024-03-29 ENCOUNTER — APPOINTMENT (OUTPATIENT)
Dept: PEDIATRIC MEDICAL GENETICS | Facility: CLINIC | Age: 29
End: 2024-03-29
Payer: MEDICAID

## 2024-03-29 ENCOUNTER — NON-APPOINTMENT (OUTPATIENT)
Age: 29
End: 2024-03-29

## 2024-03-29 DIAGNOSIS — Z31.5 ENCOUNTER FOR PROCREATIVE GENETIC COUNSELING: ICD-10-CM

## 2024-03-29 DIAGNOSIS — Z81.8 FAMILY HISTORY OF OTHER MENTAL AND BEHAVIORAL DISORDERS: ICD-10-CM

## 2024-03-29 DIAGNOSIS — Z83.3 FAMILY HISTORY OF DIABETES MELLITUS: ICD-10-CM

## 2024-03-29 DIAGNOSIS — Z78.9 OTHER SPECIFIED HEALTH STATUS: ICD-10-CM

## 2024-03-29 DIAGNOSIS — Z14.1 CYSTIC FIBROSIS CARRIER: ICD-10-CM

## 2024-03-29 DIAGNOSIS — Z84.3 FAMILY HISTORY OF CONSANGUINITY: ICD-10-CM

## 2024-03-29 DIAGNOSIS — Z83.511 FAMILY HISTORY OF GLAUCOMA: ICD-10-CM

## 2024-03-29 PROCEDURE — 96040: CPT | Mod: 95

## 2024-03-30 PROBLEM — Z81.8 FAMILY HISTORY OF AUTISM: Status: ACTIVE | Noted: 2024-03-30

## 2024-03-30 PROBLEM — Z78.9 NEVER SMOKED TOBACCO: Status: ACTIVE | Noted: 2024-03-30

## 2024-03-30 PROBLEM — Z83.511 FAMILY HISTORY OF GLAUCOMA: Status: ACTIVE | Noted: 2024-03-30

## 2024-03-30 PROBLEM — Z84.3 CONSANGUINITY: Status: ACTIVE | Noted: 2024-03-30

## 2024-03-30 PROBLEM — Z14.1 CYSTIC FIBROSIS CARRIER: Status: ACTIVE | Noted: 2024-03-30

## 2024-03-30 PROBLEM — Z83.3 FAMILY HISTORY OF DIABETES MELLITUS: Status: ACTIVE | Noted: 2024-03-30

## 2024-03-30 PROBLEM — Z31.5 ENCOUNTER FOR PROCREATIVE GENETIC COUNSELING AND TESTING: Status: ACTIVE | Noted: 2024-03-30

## 2024-03-30 RX ORDER — ELASTIC BANDAGE 2"X2.2YD
BANDAGE TOPICAL
Refills: 0 | Status: ACTIVE | COMMUNITY

## 2024-03-30 RX ORDER — CHROMIUM 200 MCG
TABLET ORAL
Refills: 0 | Status: ACTIVE | COMMUNITY

## 2024-03-30 RX ORDER — PSYLLIUM HUSK 0.4 G
CAPSULE ORAL
Refills: 0 | Status: ACTIVE | COMMUNITY

## 2024-04-16 ENCOUNTER — NON-APPOINTMENT (OUTPATIENT)
Age: 29
End: 2024-04-16

## 2024-05-08 ENCOUNTER — NON-APPOINTMENT (OUTPATIENT)
Age: 29
End: 2024-05-08

## 2024-07-02 ENCOUNTER — OUTPATIENT (OUTPATIENT)
Dept: INPATIENT UNIT | Facility: HOSPITAL | Age: 29
LOS: 1 days | Discharge: HOME | End: 2024-07-02
Payer: MEDICAID

## 2024-07-02 ENCOUNTER — APPOINTMENT (OUTPATIENT)
Dept: ANTEPARTUM | Facility: CLINIC | Age: 29
End: 2024-07-02

## 2024-07-02 VITALS
SYSTOLIC BLOOD PRESSURE: 107 MMHG | HEART RATE: 108 BPM | RESPIRATION RATE: 16 BRPM | DIASTOLIC BLOOD PRESSURE: 76 MMHG | TEMPERATURE: 96 F

## 2024-07-02 VITALS — SYSTOLIC BLOOD PRESSURE: 112 MMHG | HEART RATE: 100 BPM | DIASTOLIC BLOOD PRESSURE: 66 MMHG

## 2024-07-02 DIAGNOSIS — O26.899 OTHER SPECIFIED PREGNANCY RELATED CONDITIONS, UNSPECIFIED TRIMESTER: ICD-10-CM

## 2024-07-02 DIAGNOSIS — Z98.891 HISTORY OF UTERINE SCAR FROM PREVIOUS SURGERY: Chronic | ICD-10-CM

## 2024-07-02 PROCEDURE — 99221 1ST HOSP IP/OBS SF/LOW 40: CPT | Mod: 25

## 2024-07-02 PROCEDURE — 83986 ASSAY PH BODY FLUID NOS: CPT | Mod: QW

## 2024-07-02 PROCEDURE — 59025 FETAL NON-STRESS TEST: CPT | Mod: 26

## 2024-07-02 RX ORDER — PRENATAL VIT/IRON FUM/FOLIC AC 60 MG-1 MG
1 TABLET ORAL
Refills: 0 | DISCHARGE

## 2024-07-04 DIAGNOSIS — O34.211 MATERNAL CARE FOR LOW TRANSVERSE SCAR FROM PREVIOUS CESAREAN DELIVERY: ICD-10-CM

## 2024-07-04 DIAGNOSIS — Z03.71 ENCOUNTER FOR SUSPECTED PROBLEM WITH AMNIOTIC CAVITY AND MEMBRANE RULED OUT: ICD-10-CM

## 2024-07-04 DIAGNOSIS — E78.5 HYPERLIPIDEMIA, UNSPECIFIED: ICD-10-CM

## 2024-07-04 DIAGNOSIS — Z87.59 PERSONAL HISTORY OF OTHER COMPLICATIONS OF PREGNANCY, CHILDBIRTH AND THE PUERPERIUM: ICD-10-CM

## 2024-07-04 DIAGNOSIS — O99.283 ENDOCRINE, NUTRITIONAL AND METABOLIC DISEASES COMPLICATING PREGNANCY, THIRD TRIMESTER: ICD-10-CM

## 2024-07-04 DIAGNOSIS — O99.343 OTHER MENTAL DISORDERS COMPLICATING PREGNANCY, THIRD TRIMESTER: ICD-10-CM

## 2024-07-04 DIAGNOSIS — O99.213 OBESITY COMPLICATING PREGNANCY, THIRD TRIMESTER: ICD-10-CM

## 2024-07-04 DIAGNOSIS — F32.A DEPRESSION, UNSPECIFIED: ICD-10-CM

## 2024-07-04 DIAGNOSIS — E66.9 OBESITY, UNSPECIFIED: ICD-10-CM

## 2024-07-04 DIAGNOSIS — Z3A.33 33 WEEKS GESTATION OF PREGNANCY: ICD-10-CM

## 2024-07-25 ENCOUNTER — APPOINTMENT (OUTPATIENT)
Dept: ANTEPARTUM | Facility: CLINIC | Age: 29
End: 2024-07-25
Payer: MEDICAID

## 2024-07-25 ENCOUNTER — ASOB RESULT (OUTPATIENT)
Age: 29
End: 2024-07-25

## 2024-07-25 PROCEDURE — 76819 FETAL BIOPHYS PROFIL W/O NST: CPT | Mod: 26

## 2024-07-25 PROCEDURE — 76815 OB US LIMITED FETUS(S): CPT | Mod: 26

## 2024-08-13 ENCOUNTER — TRANSCRIPTION ENCOUNTER (OUTPATIENT)
Age: 29
End: 2024-08-13

## 2024-08-14 ENCOUNTER — APPOINTMENT (OUTPATIENT)
Dept: ANTEPARTUM | Facility: CLINIC | Age: 29
End: 2024-08-14

## 2024-08-14 ENCOUNTER — INPATIENT (INPATIENT)
Facility: HOSPITAL | Age: 29
LOS: 2 days | Discharge: HOME | End: 2024-08-17
Attending: OBSTETRICS & GYNECOLOGY | Admitting: OBSTETRICS & GYNECOLOGY
Payer: MEDICAID

## 2024-08-14 VITALS
SYSTOLIC BLOOD PRESSURE: 109 MMHG | RESPIRATION RATE: 16 BRPM | HEART RATE: 105 BPM | TEMPERATURE: 98 F | DIASTOLIC BLOOD PRESSURE: 83 MMHG

## 2024-08-14 DIAGNOSIS — O26.899 OTHER SPECIFIED PREGNANCY RELATED CONDITIONS, UNSPECIFIED TRIMESTER: ICD-10-CM

## 2024-08-14 DIAGNOSIS — O24.419 GESTATIONAL DIABETES MELLITUS IN PREGNANCY, UNSPECIFIED CONTROL: ICD-10-CM

## 2024-08-14 DIAGNOSIS — Z98.891 HISTORY OF UTERINE SCAR FROM PREVIOUS SURGERY: Chronic | ICD-10-CM

## 2024-08-14 LAB
BASOPHILS # BLD AUTO: 0.03 K/UL — SIGNIFICANT CHANGE UP (ref 0–0.2)
BASOPHILS NFR BLD AUTO: 0.4 % — SIGNIFICANT CHANGE UP (ref 0–2)
EOSINOPHIL # BLD AUTO: 0.02 K/UL — SIGNIFICANT CHANGE UP (ref 0–0.5)
EOSINOPHIL NFR BLD AUTO: 0.3 % — SIGNIFICANT CHANGE UP (ref 0–6)
GLUCOSE BLDC GLUCOMTR-MCNC: 79 MG/DL — SIGNIFICANT CHANGE UP (ref 70–99)
HCT VFR BLD CALC: 42.4 % — SIGNIFICANT CHANGE UP (ref 34.5–45)
HGB BLD-MCNC: 14.2 G/DL — SIGNIFICANT CHANGE UP (ref 11.5–15.5)
IANC: 5.4 K/UL — SIGNIFICANT CHANGE UP (ref 1.8–7.4)
IMM GRANULOCYTES NFR BLD AUTO: 0.8 % — SIGNIFICANT CHANGE UP (ref 0–0.9)
LYMPHOCYTES # BLD AUTO: 1.73 K/UL — SIGNIFICANT CHANGE UP (ref 1–3.3)
LYMPHOCYTES # BLD AUTO: 22.2 % — SIGNIFICANT CHANGE UP (ref 13–44)
MCHC RBC-ENTMCNC: 32.4 PG — SIGNIFICANT CHANGE UP (ref 27–34)
MCHC RBC-ENTMCNC: 33.5 GM/DL — SIGNIFICANT CHANGE UP (ref 32–36)
MCV RBC AUTO: 96.8 FL — SIGNIFICANT CHANGE UP (ref 80–100)
MONOCYTES # BLD AUTO: 0.56 K/UL — SIGNIFICANT CHANGE UP (ref 0–0.9)
MONOCYTES NFR BLD AUTO: 7.2 % — SIGNIFICANT CHANGE UP (ref 2–14)
NEUTROPHILS # BLD AUTO: 5.4 K/UL — SIGNIFICANT CHANGE UP (ref 1.8–7.4)
NEUTROPHILS NFR BLD AUTO: 69.1 % — SIGNIFICANT CHANGE UP (ref 43–77)
NRBC # BLD AUTO: 0 K/UL — SIGNIFICANT CHANGE UP (ref 0–0)
NRBC # BLD: 0 /100 WBCS — SIGNIFICANT CHANGE UP (ref 0–0)
NRBC # FLD: 0 K/UL — SIGNIFICANT CHANGE UP (ref 0–0)
NRBC BLD-RTO: 0 /100 WBCS — SIGNIFICANT CHANGE UP (ref 0–0)
PLATELET # BLD AUTO: 203 K/UL — SIGNIFICANT CHANGE UP (ref 150–400)
RBC # BLD: 4.38 M/UL — SIGNIFICANT CHANGE UP (ref 3.8–5.2)
RBC # FLD: 12.7 % — SIGNIFICANT CHANGE UP (ref 10.3–14.5)
RH IG SCN BLD-IMP: POSITIVE — SIGNIFICANT CHANGE UP
RH IG SCN BLD-IMP: POSITIVE — SIGNIFICANT CHANGE UP
T PALLIDUM AB TITR SER: NEGATIVE — SIGNIFICANT CHANGE UP
WBC # BLD: 7.8 K/UL — SIGNIFICANT CHANGE UP (ref 3.8–10.5)
WBC # FLD AUTO: 7.8 K/UL — SIGNIFICANT CHANGE UP (ref 3.8–10.5)

## 2024-08-14 PROCEDURE — 59514 CESAREAN DELIVERY ONLY: CPT | Mod: AS,U9

## 2024-08-14 DEVICE — ARISTA 3GR: Type: IMPLANTABLE DEVICE | Status: FUNCTIONAL

## 2024-08-14 RX ORDER — SODIUM CHLORIDE 9 G/1000ML
1000 INJECTION, SOLUTION INTRAVENOUS
Refills: 0 | Status: DISCONTINUED | OUTPATIENT
Start: 2024-08-14 | End: 2024-08-14

## 2024-08-14 RX ORDER — ACETAMINOPHEN 500 MG/5ML
975 LIQUID (ML) ORAL
Refills: 0 | Status: DISCONTINUED | OUTPATIENT
Start: 2024-08-14 | End: 2024-08-17

## 2024-08-14 RX ORDER — ONDANSETRON HCL/PF 4 MG/2 ML
4 VIAL (ML) INJECTION EVERY 6 HOURS
Refills: 0 | Status: DISCONTINUED | OUTPATIENT
Start: 2024-08-14 | End: 2024-08-16

## 2024-08-14 RX ORDER — OXYCODONE HYDROCHLORIDE 30 MG/1
5 TABLET ORAL ONCE
Refills: 0 | Status: DISCONTINUED | OUTPATIENT
Start: 2024-08-14 | End: 2024-08-17

## 2024-08-14 RX ORDER — OXYTOCIN-SODIUM CHLORIDE 0.9% IV SOLN 30 UNIT/500ML 30-0.9/5 UT/ML-%
333.33 SOLUTION INTRAVENOUS
Qty: 20 | Refills: 0 | Status: DISCONTINUED | OUTPATIENT
Start: 2024-08-14 | End: 2024-08-14

## 2024-08-14 RX ORDER — KETOROLAC TROMETHAMINE 30 MG/ML
30 INJECTION, SOLUTION INTRAMUSCULAR; INTRAVENOUS EVERY 6 HOURS
Refills: 0 | Status: DISCONTINUED | OUTPATIENT
Start: 2024-08-14 | End: 2024-08-15

## 2024-08-14 RX ORDER — DIPHENHYDRAMINE HCL 12.5MG/5ML
25 ELIXIR ORAL EVERY 6 HOURS
Refills: 0 | Status: DISCONTINUED | OUTPATIENT
Start: 2024-08-14 | End: 2024-08-17

## 2024-08-14 RX ORDER — PRENATAL 136/IRON/FOLIC ACID 27 MG-1 MG
1 TABLET ORAL DAILY
Refills: 0 | Status: DISCONTINUED | OUTPATIENT
Start: 2024-08-14 | End: 2024-08-17

## 2024-08-14 RX ORDER — MAGNESIUM HYDROXIDE 400 MG/5ML
30 SUSPENSION ORAL
Refills: 0 | Status: DISCONTINUED | OUTPATIENT
Start: 2024-08-14 | End: 2024-08-17

## 2024-08-14 RX ORDER — FERROUS SULFATE 137(45) MG
325 TABLET, EXTENDED RELEASE ORAL DAILY
Refills: 0 | Status: DISCONTINUED | OUTPATIENT
Start: 2024-08-14 | End: 2024-08-17

## 2024-08-14 RX ORDER — SODIUM CHLORIDE 9 G/1000ML
500 INJECTION, SOLUTION INTRAVENOUS
Refills: 0 | Status: DISCONTINUED | OUTPATIENT
Start: 2024-08-14 | End: 2024-08-14

## 2024-08-14 RX ORDER — MODIFIED LANOLIN 100 %
1 CREAM (GRAM) TOPICAL EVERY 6 HOURS
Refills: 0 | Status: DISCONTINUED | OUTPATIENT
Start: 2024-08-14 | End: 2024-08-17

## 2024-08-14 RX ORDER — CITRIC ACID/SODIUM CITRATE 300-500 MG
30 SOLUTION, ORAL ORAL ONCE
Refills: 0 | Status: DISCONTINUED | OUTPATIENT
Start: 2024-08-14 | End: 2024-08-14

## 2024-08-14 RX ORDER — OXYCODONE HYDROCHLORIDE 30 MG/1
5 TABLET ORAL
Refills: 0 | Status: DISCONTINUED | OUTPATIENT
Start: 2024-08-14 | End: 2024-08-16

## 2024-08-14 RX ORDER — SENNA 187 MG
2 TABLET ORAL AT BEDTIME
Refills: 0 | Status: DISCONTINUED | OUTPATIENT
Start: 2024-08-14 | End: 2024-08-17

## 2024-08-14 RX ORDER — OXYCODONE HYDROCHLORIDE 30 MG/1
5 TABLET ORAL
Refills: 0 | Status: DISCONTINUED | OUTPATIENT
Start: 2024-08-14 | End: 2024-08-17

## 2024-08-14 RX ORDER — OXYCODONE HYDROCHLORIDE 30 MG/1
10 TABLET ORAL
Refills: 0 | Status: DISCONTINUED | OUTPATIENT
Start: 2024-08-14 | End: 2024-08-16

## 2024-08-14 RX ORDER — NALOXONE HYDROCHLORIDE 0.4 MG/ML
0.1 INJECTION, SOLUTION INTRAMUSCULAR; INTRAVENOUS; SUBCUTANEOUS
Refills: 0 | Status: DISCONTINUED | OUTPATIENT
Start: 2024-08-14 | End: 2024-08-16

## 2024-08-14 RX ORDER — SIMETHICONE 80 MG
80 TABLET,CHEWABLE ORAL EVERY 4 HOURS
Refills: 0 | Status: DISCONTINUED | OUTPATIENT
Start: 2024-08-14 | End: 2024-08-17

## 2024-08-14 RX ORDER — HYDROMORPHONE/SOD CHLOR,ISO/PF 2 MG/10 ML
1 SYRINGE (ML) INJECTION
Refills: 0 | Status: DISCONTINUED | OUTPATIENT
Start: 2024-08-14 | End: 2024-08-16

## 2024-08-14 RX ORDER — IBUPROFEN 200 MG
600 TABLET ORAL EVERY 6 HOURS
Refills: 0 | Status: COMPLETED | OUTPATIENT
Start: 2024-08-14 | End: 2025-07-13

## 2024-08-14 RX ORDER — HEPARIN SODIUM 1000 [USP'U]/ML
5000 INJECTION INTRAVENOUS; SUBCUTANEOUS EVERY 12 HOURS
Refills: 0 | Status: DISCONTINUED | OUTPATIENT
Start: 2024-08-14 | End: 2024-08-17

## 2024-08-14 RX ORDER — NALBUPHINE HYDROCHLORIDE 10 MG/ML
2.5 INJECTION INTRAMUSCULAR; INTRAVENOUS; SUBCUTANEOUS EVERY 6 HOURS
Refills: 0 | Status: DISCONTINUED | OUTPATIENT
Start: 2024-08-14 | End: 2024-08-16

## 2024-08-14 RX ADMIN — KETOROLAC TROMETHAMINE 30 MILLIGRAM(S): 30 INJECTION, SOLUTION INTRAMUSCULAR; INTRAVENOUS at 16:10

## 2024-08-14 RX ADMIN — Medication 20 MILLIGRAM(S): at 07:26

## 2024-08-14 RX ADMIN — Medication 2 TABLET(S): at 21:07

## 2024-08-14 RX ADMIN — Medication 975 MILLIGRAM(S): at 21:30

## 2024-08-14 RX ADMIN — HEPARIN SODIUM 5000 UNIT(S): 1000 INJECTION INTRAVENOUS; SUBCUTANEOUS at 16:10

## 2024-08-14 RX ADMIN — Medication 975 MILLIGRAM(S): at 21:06

## 2024-08-15 ENCOUNTER — TRANSCRIPTION ENCOUNTER (OUTPATIENT)
Age: 29
End: 2024-08-15

## 2024-08-15 LAB
BASOPHILS # BLD AUTO: 0.03 K/UL — SIGNIFICANT CHANGE UP (ref 0–0.2)
BASOPHILS NFR BLD AUTO: 0.3 % — SIGNIFICANT CHANGE UP (ref 0–2)
EOSINOPHIL # BLD AUTO: 0.03 K/UL — SIGNIFICANT CHANGE UP (ref 0–0.5)
EOSINOPHIL NFR BLD AUTO: 0.3 % — SIGNIFICANT CHANGE UP (ref 0–6)
HCT VFR BLD CALC: 35.7 % — SIGNIFICANT CHANGE UP (ref 34.5–45)
HGB BLD-MCNC: 12.1 G/DL — SIGNIFICANT CHANGE UP (ref 11.5–15.5)
IANC: 7.75 K/UL — HIGH (ref 1.8–7.4)
IMM GRANULOCYTES NFR BLD AUTO: 0.5 % — SIGNIFICANT CHANGE UP (ref 0–0.9)
LYMPHOCYTES # BLD AUTO: 1.89 K/UL — SIGNIFICANT CHANGE UP (ref 1–3.3)
LYMPHOCYTES # BLD AUTO: 17.9 % — SIGNIFICANT CHANGE UP (ref 13–44)
MCHC RBC-ENTMCNC: 32.7 PG — SIGNIFICANT CHANGE UP (ref 27–34)
MCHC RBC-ENTMCNC: 33.9 GM/DL — SIGNIFICANT CHANGE UP (ref 32–36)
MCV RBC AUTO: 96.5 FL — SIGNIFICANT CHANGE UP (ref 80–100)
MONOCYTES # BLD AUTO: 0.83 K/UL — SIGNIFICANT CHANGE UP (ref 0–0.9)
MONOCYTES NFR BLD AUTO: 7.8 % — SIGNIFICANT CHANGE UP (ref 2–14)
NEUTROPHILS # BLD AUTO: 7.75 K/UL — HIGH (ref 1.8–7.4)
NEUTROPHILS NFR BLD AUTO: 73.2 % — SIGNIFICANT CHANGE UP (ref 43–77)
NRBC # BLD AUTO: 0 K/UL — SIGNIFICANT CHANGE UP (ref 0–0)
NRBC # BLD: 0 /100 WBCS — SIGNIFICANT CHANGE UP (ref 0–0)
NRBC # FLD: 0 K/UL — SIGNIFICANT CHANGE UP (ref 0–0)
NRBC BLD-RTO: 0 /100 WBCS — SIGNIFICANT CHANGE UP (ref 0–0)
PLATELET # BLD AUTO: 183 K/UL — SIGNIFICANT CHANGE UP (ref 150–400)
RBC # BLD: 3.7 M/UL — LOW (ref 3.8–5.2)
RBC # FLD: 12.9 % — SIGNIFICANT CHANGE UP (ref 10.3–14.5)
WBC # BLD: 10.58 K/UL — HIGH (ref 3.8–10.5)
WBC # FLD AUTO: 10.58 K/UL — HIGH (ref 3.8–10.5)

## 2024-08-15 RX ORDER — IBUPROFEN 200 MG
600 TABLET ORAL EVERY 6 HOURS
Refills: 0 | Status: DISCONTINUED | OUTPATIENT
Start: 2024-08-15 | End: 2024-08-17

## 2024-08-15 RX ADMIN — Medication 600 MILLIGRAM(S): at 11:40

## 2024-08-15 RX ADMIN — Medication 600 MILLIGRAM(S): at 18:42

## 2024-08-15 RX ADMIN — Medication 2 TABLET(S): at 23:57

## 2024-08-15 RX ADMIN — Medication 975 MILLIGRAM(S): at 15:46

## 2024-08-15 RX ADMIN — Medication 975 MILLIGRAM(S): at 20:35

## 2024-08-15 RX ADMIN — Medication 975 MILLIGRAM(S): at 16:16

## 2024-08-15 RX ADMIN — HEPARIN SODIUM 5000 UNIT(S): 1000 INJECTION INTRAVENOUS; SUBCUTANEOUS at 18:12

## 2024-08-15 RX ADMIN — Medication 80 MILLIGRAM(S): at 03:34

## 2024-08-15 RX ADMIN — HEPARIN SODIUM 5000 UNIT(S): 1000 INJECTION INTRAVENOUS; SUBCUTANEOUS at 05:53

## 2024-08-15 RX ADMIN — Medication 975 MILLIGRAM(S): at 03:32

## 2024-08-15 RX ADMIN — Medication 975 MILLIGRAM(S): at 20:04

## 2024-08-15 RX ADMIN — Medication 600 MILLIGRAM(S): at 18:12

## 2024-08-15 RX ADMIN — Medication 80 MILLIGRAM(S): at 20:05

## 2024-08-15 RX ADMIN — Medication 600 MILLIGRAM(S): at 12:10

## 2024-08-15 RX ADMIN — Medication 975 MILLIGRAM(S): at 04:00

## 2024-08-16 RX ORDER — IBUPROFEN 200 MG
1 TABLET ORAL
Qty: 0 | Refills: 0 | DISCHARGE
Start: 2024-08-16

## 2024-08-16 RX ORDER — PRENATAL 136/IRON/FOLIC ACID 27 MG-1 MG
1 TABLET ORAL
Qty: 0 | Refills: 0 | DISCHARGE
Start: 2024-08-16

## 2024-08-16 RX ORDER — ACETAMINOPHEN 500 MG/5ML
3 LIQUID (ML) ORAL
Qty: 0 | Refills: 0 | DISCHARGE
Start: 2024-08-16

## 2024-08-16 RX ADMIN — Medication 600 MILLIGRAM(S): at 17:31

## 2024-08-16 RX ADMIN — Medication 975 MILLIGRAM(S): at 09:54

## 2024-08-16 RX ADMIN — HEPARIN SODIUM 5000 UNIT(S): 1000 INJECTION INTRAVENOUS; SUBCUTANEOUS at 17:01

## 2024-08-16 RX ADMIN — Medication 975 MILLIGRAM(S): at 21:29

## 2024-08-16 RX ADMIN — Medication 600 MILLIGRAM(S): at 17:01

## 2024-08-16 RX ADMIN — Medication 600 MILLIGRAM(S): at 12:33

## 2024-08-16 RX ADMIN — Medication 975 MILLIGRAM(S): at 22:00

## 2024-08-16 RX ADMIN — Medication 600 MILLIGRAM(S): at 06:12

## 2024-08-16 RX ADMIN — HEPARIN SODIUM 5000 UNIT(S): 1000 INJECTION INTRAVENOUS; SUBCUTANEOUS at 05:34

## 2024-08-16 RX ADMIN — Medication 600 MILLIGRAM(S): at 12:03

## 2024-08-16 RX ADMIN — Medication 600 MILLIGRAM(S): at 05:34

## 2024-08-16 RX ADMIN — Medication 975 MILLIGRAM(S): at 09:24

## 2024-08-17 VITALS
DIASTOLIC BLOOD PRESSURE: 69 MMHG | OXYGEN SATURATION: 99 % | HEART RATE: 72 BPM | RESPIRATION RATE: 18 BRPM | SYSTOLIC BLOOD PRESSURE: 105 MMHG | TEMPERATURE: 98 F

## 2024-08-17 RX ADMIN — Medication 975 MILLIGRAM(S): at 02:53

## 2024-08-17 RX ADMIN — Medication 600 MILLIGRAM(S): at 00:40

## 2024-08-17 RX ADMIN — Medication 975 MILLIGRAM(S): at 03:25

## 2024-08-17 RX ADMIN — Medication 600 MILLIGRAM(S): at 00:08

## 2024-08-17 RX ADMIN — Medication 600 MILLIGRAM(S): at 06:45

## 2024-08-17 RX ADMIN — HEPARIN SODIUM 5000 UNIT(S): 1000 INJECTION INTRAVENOUS; SUBCUTANEOUS at 06:15

## 2024-08-17 RX ADMIN — Medication 600 MILLIGRAM(S): at 12:34

## 2024-08-17 RX ADMIN — Medication 600 MILLIGRAM(S): at 06:15
